# Patient Record
Sex: FEMALE | Race: WHITE | NOT HISPANIC OR LATINO
[De-identification: names, ages, dates, MRNs, and addresses within clinical notes are randomized per-mention and may not be internally consistent; named-entity substitution may affect disease eponyms.]

---

## 2017-01-24 ENCOUNTER — APPOINTMENT (OUTPATIENT)
Age: 57
End: 2017-01-24

## 2017-02-28 ENCOUNTER — APPOINTMENT (OUTPATIENT)
Age: 57
End: 2017-02-28

## 2017-04-04 ENCOUNTER — APPOINTMENT (OUTPATIENT)
Age: 57
End: 2017-04-04

## 2017-10-18 ENCOUNTER — OUTPATIENT (OUTPATIENT)
Dept: OUTPATIENT SERVICES | Facility: HOSPITAL | Age: 57
LOS: 1 days | End: 2017-10-18
Payer: COMMERCIAL

## 2017-10-18 ENCOUNTER — APPOINTMENT (OUTPATIENT)
Dept: ULTRASOUND IMAGING | Facility: IMAGING CENTER | Age: 57
End: 2017-10-18
Payer: COMMERCIAL

## 2017-10-18 DIAGNOSIS — Z00.8 ENCOUNTER FOR OTHER GENERAL EXAMINATION: ICD-10-CM

## 2017-10-18 PROCEDURE — 76536 US EXAM OF HEAD AND NECK: CPT

## 2017-10-18 PROCEDURE — 76536 US EXAM OF HEAD AND NECK: CPT | Mod: 26

## 2018-03-06 ENCOUNTER — APPOINTMENT (OUTPATIENT)
Age: 58
End: 2018-03-06
Payer: OTHER MISCELLANEOUS

## 2018-03-06 PROCEDURE — 99213 OFFICE O/P EST LOW 20 MIN: CPT

## 2018-03-12 ENCOUNTER — FORM ENCOUNTER (OUTPATIENT)
Age: 58
End: 2018-03-12

## 2018-03-13 ENCOUNTER — OUTPATIENT (OUTPATIENT)
Dept: OUTPATIENT SERVICES | Facility: HOSPITAL | Age: 58
LOS: 1 days | End: 2018-03-13
Payer: COMMERCIAL

## 2018-03-13 ENCOUNTER — APPOINTMENT (OUTPATIENT)
Dept: MRI IMAGING | Facility: IMAGING CENTER | Age: 58
End: 2018-03-13
Payer: OTHER MISCELLANEOUS

## 2018-03-13 DIAGNOSIS — M25.562 PAIN IN LEFT KNEE: ICD-10-CM

## 2018-03-13 PROCEDURE — 73721 MRI JNT OF LWR EXTRE W/O DYE: CPT | Mod: 26,LT

## 2018-03-13 PROCEDURE — 73721 MRI JNT OF LWR EXTRE W/O DYE: CPT

## 2018-04-03 ENCOUNTER — APPOINTMENT (OUTPATIENT)
Age: 58
End: 2018-04-03
Payer: OTHER MISCELLANEOUS

## 2018-04-03 PROCEDURE — 99213 OFFICE O/P EST LOW 20 MIN: CPT | Mod: 25

## 2018-04-03 PROCEDURE — 20611 DRAIN/INJ JOINT/BURSA W/US: CPT

## 2018-05-14 ENCOUNTER — LABORATORY RESULT (OUTPATIENT)
Age: 58
End: 2018-05-14

## 2018-05-14 ENCOUNTER — APPOINTMENT (OUTPATIENT)
Dept: ENDOCRINOLOGY | Facility: CLINIC | Age: 58
End: 2018-05-14
Payer: COMMERCIAL

## 2018-05-14 VITALS
HEART RATE: 74 BPM | WEIGHT: 220 LBS | OXYGEN SATURATION: 97 % | DIASTOLIC BLOOD PRESSURE: 70 MMHG | HEIGHT: 71 IN | SYSTOLIC BLOOD PRESSURE: 130 MMHG | BODY MASS INDEX: 30.8 KG/M2

## 2018-05-14 PROCEDURE — 99214 OFFICE O/P EST MOD 30 MIN: CPT

## 2018-05-16 LAB
T3RU NFR SERPL: 1.07 INDEX
T4 SERPL-MCNC: 7.6 UG/DL
TSH SERPL-ACNC: 3.51 UIU/ML

## 2018-06-08 ENCOUNTER — LABORATORY RESULT (OUTPATIENT)
Age: 58
End: 2018-06-08

## 2018-10-18 ENCOUNTER — RESULT REVIEW (OUTPATIENT)
Age: 58
End: 2018-10-18

## 2019-01-17 ENCOUNTER — MEDICATION RENEWAL (OUTPATIENT)
Age: 59
End: 2019-01-17

## 2019-03-05 ENCOUNTER — APPOINTMENT (OUTPATIENT)
Dept: INTERNAL MEDICINE | Facility: CLINIC | Age: 59
End: 2019-03-05
Payer: COMMERCIAL

## 2019-03-05 ENCOUNTER — NON-APPOINTMENT (OUTPATIENT)
Age: 59
End: 2019-03-05

## 2019-03-05 VITALS
OXYGEN SATURATION: 98 % | WEIGHT: 221 LBS | HEART RATE: 70 BPM | BODY MASS INDEX: 30.94 KG/M2 | DIASTOLIC BLOOD PRESSURE: 82 MMHG | HEIGHT: 71 IN | SYSTOLIC BLOOD PRESSURE: 110 MMHG

## 2019-03-05 DIAGNOSIS — Z11.59 ENCOUNTER FOR SCREENING FOR OTHER VIRAL DISEASES: ICD-10-CM

## 2019-03-05 DIAGNOSIS — Z80.1 FAMILY HISTORY OF MALIGNANT NEOPLASM OF TRACHEA, BRONCHUS AND LUNG: ICD-10-CM

## 2019-03-05 DIAGNOSIS — Z83.3 FAMILY HISTORY OF DIABETES MELLITUS: ICD-10-CM

## 2019-03-05 PROCEDURE — 99386 PREV VISIT NEW AGE 40-64: CPT | Mod: 25

## 2019-03-05 PROCEDURE — G0444 DEPRESSION SCREEN ANNUAL: CPT

## 2019-03-05 PROCEDURE — 36415 COLL VENOUS BLD VENIPUNCTURE: CPT

## 2019-03-05 PROCEDURE — 93000 ELECTROCARDIOGRAM COMPLETE: CPT

## 2019-03-05 NOTE — PHYSICAL EXAM
[No Acute Distress] : no acute distress [Well Nourished] : well nourished [Well Developed] : well developed [Well-Appearing] : well-appearing [Normal Voice/Communication] : normal voice/communication [Normal Sclera/Conjunctiva] : normal sclera/conjunctiva [PERRL] : pupils equal round and reactive to light [EOMI] : extraocular movements intact [Normal Outer Ear/Nose] : the outer ears and nose were normal in appearance [Normal Oropharynx] : the oropharynx was normal [Normal TMs] : both tympanic membranes were normal [No JVD] : no jugular venous distention [Supple] : supple [No Lymphadenopathy] : no lymphadenopathy [Thyroid Normal, No Nodules] : the thyroid was normal and there were no nodules present [No Respiratory Distress] : no respiratory distress  [Clear to Auscultation] : lungs were clear to auscultation bilaterally [No Accessory Muscle Use] : no accessory muscle use [Normal Rate] : normal rate  [Regular Rhythm] : with a regular rhythm [Normal S1, S2] : normal S1 and S2 [No Murmur] : no murmur heard [No Carotid Bruits] : no carotid bruits [No Abdominal Bruit] : a ~M bruit was not heard ~T in the abdomen [No Varicosities] : no varicosities [Pedal Pulses Present] : the pedal pulses are present [No Edema] : there was no peripheral edema [No Extremity Clubbing/Cyanosis] : no extremity clubbing/cyanosis [No Palpable Aorta] : no palpable aorta [Normal Appearance] : normal in appearance [No Nipple Discharge] : no nipple discharge [No Axillary Lymphadenopathy] : no axillary lymphadenopathy [Soft] : abdomen soft [Non Tender] : non-tender [Non-distended] : non-distended [No Masses] : no abdominal mass palpated [No HSM] : no HSM [Normal Bowel Sounds] : normal bowel sounds [Normal Posterior Cervical Nodes] : no posterior cervical lymphadenopathy [Normal Anterior Cervical Nodes] : no anterior cervical lymphadenopathy [No CVA Tenderness] : no CVA  tenderness [No Spinal Tenderness] : no spinal tenderness [No Joint Swelling] : no joint swelling [Grossly Normal Strength/Tone] : grossly normal strength/tone [No Rash] : no rash [Normal Gait] : normal gait [Coordination Grossly Intact] : coordination grossly intact [No Focal Deficits] : no focal deficits [Deep Tendon Reflexes (DTR)] : deep tendon reflexes were 2+ and symmetric [Normal Affect] : the affect was normal [Normal Insight/Judgement] : insight and judgment were intact

## 2019-03-07 PROBLEM — Z11.59 NEED FOR HEPATITIS C SCREENING TEST: Status: ACTIVE | Noted: 2019-03-05

## 2019-03-07 PROBLEM — Z80.1 FAMILY HISTORY OF LUNG CANCER: Status: ACTIVE | Noted: 2019-03-07

## 2019-03-07 PROBLEM — Z83.3 FAMILY HISTORY OF TYPE 2 DIABETES MELLITUS: Status: ACTIVE | Noted: 2019-03-07

## 2019-03-07 NOTE — HISTORY OF PRESENT ILLNESS
[de-identified] : New Patient - 1st visit to [facility] [place].

## 2019-03-07 NOTE — ASSESSMENT
[FreeTextEntry1] : (1) HCM - discussed diet, exercise, weight maintenance.  Labs ordered as below, including Hepatitis C screening.  HIV testing offered to patient and patient declined.  Patient reports an influenza vaccination this past season.  She will check with [facility] to see if she received a Tdap.  Patient can get the Shingrix when it becomes available (currently on national backorder).  Continue annual screening mammograms and Gyn visits as directed.  Patient declines screening colonoscopy but is willing to do FIT.  Patient given Health Care Proxy information today.\par \par (2) CV - patient on diltiazem for prior atrial tachycardia, and has been stable.  ECG is NSR.\par \par (3) Endo - follow-up with Dr. Kennedy for thyroid nodules.

## 2019-03-22 LAB
25(OH)D3 SERPL-MCNC: 25 NG/ML
ALBUMIN SERPL ELPH-MCNC: 4.4 G/DL
ALP BLD-CCNC: 72 U/L
ALT SERPL-CCNC: 14 U/L
ANION GAP SERPL CALC-SCNC: 17 MMOL/L
APPEARANCE: CLEAR
AST SERPL-CCNC: 18 U/L
BACTERIA: NEGATIVE
BASOPHILS # BLD AUTO: 0.11 K/UL
BASOPHILS NFR BLD AUTO: 1.4 %
BILIRUB SERPL-MCNC: 0.3 MG/DL
BILIRUBIN URINE: NEGATIVE
BLOOD URINE: NEGATIVE
BUN SERPL-MCNC: 14 MG/DL
CALCIUM SERPL-MCNC: 9.4 MG/DL
CHLORIDE SERPL-SCNC: 108 MMOL/L
CHOLEST SERPL-MCNC: 209 MG/DL
CHOLEST/HDLC SERPL: 3.4 RATIO
CO2 SERPL-SCNC: 18 MMOL/L
COLOR: YELLOW
CREAT SERPL-MCNC: 0.99 MG/DL
EOSINOPHIL # BLD AUTO: 0.2 K/UL
EOSINOPHIL NFR BLD AUTO: 2.5 %
GLUCOSE QUALITATIVE U: NEGATIVE
GLUCOSE SERPL-MCNC: 95 MG/DL
HBA1C MFR BLD HPLC: 5.3 %
HCT VFR BLD CALC: 44.4 %
HCV AB SER QL: NONREACTIVE
HCV S/CO RATIO: 0.19 S/CO
HDLC SERPL-MCNC: 62 MG/DL
HGB BLD-MCNC: 14.6 G/DL
HYALINE CASTS: 1 /LPF
IMM GRANULOCYTES NFR BLD AUTO: 0.1 %
KETONES URINE: NEGATIVE
LDLC SERPL CALC-MCNC: 124 MG/DL
LEUKOCYTE ESTERASE URINE: NEGATIVE
LYMPHOCYTES # BLD AUTO: 2.27 K/UL
LYMPHOCYTES NFR BLD AUTO: 28.4 %
MAN DIFF?: NORMAL
MCHC RBC-ENTMCNC: 29.2 PG
MCHC RBC-ENTMCNC: 32.9 GM/DL
MCV RBC AUTO: 88.8 FL
MICROSCOPIC-UA: NORMAL
MONOCYTES # BLD AUTO: 0.65 K/UL
MONOCYTES NFR BLD AUTO: 8.1 %
NEUTROPHILS # BLD AUTO: 4.75 K/UL
NEUTROPHILS NFR BLD AUTO: 59.5 %
NITRITE URINE: NEGATIVE
PH URINE: 6
PLATELET # BLD AUTO: 331 K/UL
POTASSIUM SERPL-SCNC: 4.2 MMOL/L
PROT SERPL-MCNC: 7.3 G/DL
PROTEIN URINE: NORMAL
RBC # BLD: 5 M/UL
RBC # FLD: 13.2 %
RED BLOOD CELLS URINE: 6 /HPF
SODIUM SERPL-SCNC: 143 MMOL/L
SPECIFIC GRAVITY URINE: 1.02
SQUAMOUS EPITHELIAL CELLS: 4 /HPF
T4 FREE SERPL-MCNC: 1.3 NG/DL
TRIGL SERPL-MCNC: 116 MG/DL
TSH SERPL-ACNC: 3.75 UIU/ML
UROBILINOGEN URINE: NORMAL
WBC # FLD AUTO: 7.99 K/UL
WHITE BLOOD CELLS URINE: 8 /HPF

## 2019-05-14 ENCOUNTER — TRANSCRIPTION ENCOUNTER (OUTPATIENT)
Age: 59
End: 2019-05-14

## 2019-05-15 ENCOUNTER — APPOINTMENT (OUTPATIENT)
Dept: ORTHOPEDIC SURGERY | Facility: CLINIC | Age: 59
End: 2019-05-15
Payer: OTHER MISCELLANEOUS

## 2019-05-15 VITALS
BODY MASS INDEX: 30.94 KG/M2 | DIASTOLIC BLOOD PRESSURE: 77 MMHG | WEIGHT: 221 LBS | HEART RATE: 64 BPM | SYSTOLIC BLOOD PRESSURE: 123 MMHG | HEIGHT: 71 IN

## 2019-05-15 DIAGNOSIS — S50.02XA CONTUSION OF LEFT ELBOW, INITIAL ENCOUNTER: ICD-10-CM

## 2019-05-15 DIAGNOSIS — Z78.9 OTHER SPECIFIED HEALTH STATUS: ICD-10-CM

## 2019-05-15 PROCEDURE — 99204 OFFICE O/P NEW MOD 45 MIN: CPT

## 2019-05-17 NOTE — RETURN TO WORK/SCHOOL
[FreeTextEntry1] : Felisha was seen today for evaluation of right rib pain and left wrist/forearm pain status post fall. She is unable to return to work at this time. She'll be reevaluated in 2 weeks.\par Thank you for your understanding.\par \par Sincerely,\par \par Steven Canaad DO, ATC\par Primary Care Sports Medicine\par [facility] [place]\par

## 2019-05-17 NOTE — HISTORY OF PRESENT ILLNESS
[de-identified] : Patient is here for left arm/rib pain that began on 5/14/19 when she tripped on a curb while walking into work. She went to the ER where xrays were taken that were negative for fracture. She was informed by her PCP that she should follow up with an orthopedic. She has been taking Naproxen with minimal relief and has been using ice. She has difficulty moving her forearm/wrist and increased pain when taking a deep breath. Denies N/T/R/Prior injury.

## 2019-05-17 NOTE — DISCUSSION/SUMMARY
[de-identified] : Discussed findings of today's exam and possible causes of patient's pain.  Educated patient on their most probable diagnosis of left wrist/forearm pain, and right 6th rib pain status post fall.  Reviewed possible courses of treatment, and we collaboratively decided best course of treatment at this time will include conservative management. I advised the patient that she did not sustain a fracture of her right rib cage, however she has a significant rib contusion with resulting costochondritis. I advised the patient that he is injuries can take 1-2 months to resolve, and the mainstay of treatment his pain control. 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 Patient advised that she has a contusion of the left wrist/forearm with resultant swelling, no evidence of fracture or tendon/ligament rupture. At this time recommend a course of physical therapy to address the stiffness that issue. Patient can continue taking naproxen as needed, and she is requesting a stronger medications have available for the evenings to help get some sleep.  A prescription of tramadol has been prescribed, I informed the patient that this is a narcotic pain medication and while it is of low potency, it still causes sedation and it should not be taken while operating machinery, or while caring for children/family members alone.  I also advised the patient that all narcotic pain medications have addictive potential and therefore should be taken as little as possible, and for the shortest duration needed. 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  Patient appreciates and agrees with current plan.\par \par This note was generated using dragon medical dictation software.  A reasonable effort has been made for proofreading its contents, but typos may still remain.  If there are any questions or points of clarification needed please notify my office.

## 2019-05-17 NOTE — ADDENDUM
[FreeTextEntry1] : Patient was offered a same-day physical therapy appointment by our [facility], she declined that appointment. She was contacted again the following day to schedule at her next convenience, patient declined physical therapy and did not schedule any appointments.

## 2019-05-17 NOTE — PHYSICAL EXAM
[de-identified] : Constitutional: Well-nourished, well-developed, No acute distress\par Respiratory:  Good respiratory effort, no SOB\par Lymphatic: No regional lymphadenopathy, no lymphedema\par Psychiatric: Pleasant and normal affect, alert and oriented x3\par Skin: Clean dry and intact B/L UE/LE\par Musculoskeletal: normal except where as noted in regional exam\par \par Rib cage:\par \par POSITIVE TENDERNESS: + Significant TTP of the right 6th rib in the midclavicular line with diffuse pain along the neighboring ribs and costochondral junctions\par \par NONTENDER:  No tenderness left sided ribs 1-12 along the anterior, body and posterior portions b/l, no bony tenderness of the thoracic spine, no facet tenderness, no tenderness of xiphoid, sternum or manubrium, no clavicular, SC or AC joint tenderness b/l.  \par \par ROM:  Moderately decreased excursion of the rib cage with increased pain on deep breathing \par \par Thoracic Spine Exam:  normal curvature and normal alignment. forward stooped posture due to pain.  no midline bony tenderness, no marked spasm. no marked tenderness in paraspinal muscles.  ROM limited in all directions due to pain\par \par \par Right Wrist:\par APPEARANCE: no marked deformities, no swelling or malalignment\par POSITIVE TENDERNESS: none\par NONTENDER: snuffbox, scapholunate, DRUJ, TFCC, FCU/FCR, ECU/ECRL/ECRB, radial/ulnar styloid, CMC, and MCP joints. \par ROM: full & painless. \par RESISTIVE TESTING: painless resisted wrist flexion/extension, and radial/ulnar deviation. \par SPECIAL TESTS: neg Finkelstein's. neg Phalen's. neg reverse Phalen's. neg Lao's. neg katie test. neg TFCC grind. neg tinel's at the carpal tunnel\par Vasc: 2+ radial pulse\par Neuro: AIN, PIN, Ulnar nerve intact to motor\par Sensation: Intact to light touch throughout\par B/L Shoulders:  No asymmetry, malalignment, or swelling, Full ROM, 5/5 strength in flexion/ext, Abd/Add, IR/ER, Joints stable\par B/L Elbows:  No asymmetry, malalignment, or swelling, Full ROM, 5/5 strength in flex/ext, pronation/supination, Joints stable\par \par Left Wrist:\par APPEARANCE: Mild swelling of dorsal wrist with extension into the extensor forearm, no marked deformities or malalignment\par POSITIVE TENDERNESS: DRUJ, dorsal wrist, ECU/ECRL/ECRB, \par NONTENDER: snuffbox, scapholunate, TFCC, FCU/FCR, radial/ulnar styloid, CMC, and MCP joints.\par ROM: Limited in all directions due to stiffness and pain. \par RESISTIVE TESTING: 3/5 painful resisted wrist flexion/extension, and radial/ulnar deviation. \par SPECIAL TESTS: neg Finkelstein's. neg Phalen's. neg reverse Phalen's. neg Lao's. neg katie test. neg TFCC grind. neg tinel's at the carpal tunnel\par Vasc: 2+ radial pulse\par Neuro: AIN, PIN, Ulnar nerve intact to motor\par Sensation: Intact to light touch throughout\par \par  [de-identified] : I reviewed and clinically correlated the following outside imaging studies, \par Procedure was performed at the [facility] \par \par EXAM: RIBS SERIES RIGHT \par \par \par PROCEDURE DATE: 05/14/2019 \par \par \par INTERPRETATION: \par \par CLINICAL INDICATION: Right-sided rib pain status post fall. \par TECHNIQUE: PA chest. AP, BOLIVAR, and HESHAM views of the right ribs. \par \par COMPARISON: Chest x-ray 7 September 2011. \par \par FINDINGS: \par \par Chest: The lungs are clear. The cardiomediastinal silhouette, hiram, and \par vascular markings are within normal limits. No pleural effusion or \par pneumothorax. \par \par Ribs: No displaced rib fracture identified. \par \par \par \par IMPRESSION: \par No displaced rib fracture or pneumothorax. \par \par \par \par Procedure was performed at the [facility] \par \par EXAM: WRIST LEFT \par \par EXAM: FOREARM LEFT \par \par \par PROCEDURE DATE: 05/14/2019 \par \par \par INTERPRETATION: \par \par CLINICAL INDICATION: Left upper extremity pain status post fall. Question \par fracture. \par TECHNIQUE: AP and lateral radiographs of the left forearm. PA, oblique, \par lateral, and scaphoid views of the left wrist. \par \par COMPARISON: None available. \par \par FINDINGS: \par \par No acute fracture. No dislocation. Cartilage spaces are maintained. No \par abnormal soft tissue swelling or mineralization. No radiopaque foreign body. \par \par \par \par IMPRESSION: \par No acute fracture or dislocation. \par \par

## 2019-05-20 ENCOUNTER — TRANSCRIPTION ENCOUNTER (OUTPATIENT)
Age: 59
End: 2019-05-20

## 2019-05-28 ENCOUNTER — APPOINTMENT (OUTPATIENT)
Dept: ORTHOPEDIC SURGERY | Facility: CLINIC | Age: 59
End: 2019-05-28
Payer: OTHER MISCELLANEOUS

## 2019-05-28 DIAGNOSIS — S60.212A CONTUSION OF LEFT WRIST, INITIAL ENCOUNTER: ICD-10-CM

## 2019-05-28 DIAGNOSIS — S20.219A CONTUSION OF UNSPECIFIED FRONT WALL OF THORAX, INITIAL ENCOUNTER: ICD-10-CM

## 2019-05-28 PROCEDURE — 99213 OFFICE O/P EST LOW 20 MIN: CPT

## 2019-05-28 NOTE — DISCUSSION/SUMMARY
[de-identified] : Patient was seen today for followup of left wrist/forearm pain and right rib pain status post fall. She has had some good improvement in her right rib pain since last evaluation, it is more of a mild uncomfortable pain, not significant at this time. However, patient has had little to no change in her left wrist pain. She still is pain with range of motion, gripping and twisting activities. She did not go to any visits of physical therapy as she states she was too busy with her upcoming daughter's wedding plans. She states she is wearing her wrist splint most of the day, however she presents to the office today not wearing her splint which she states is in the car. Patient advised that she still has no focal deficits on exam, he has significant ligament or tendon injury. This is unfortunately expected timeframe of recovery with a significant wrist contusion and injury. Her daughter's wedding is this weekend, patient is advised she should start her course of physical therapy next week if she cannot get him this week. 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  Follow up as needed.  Patient appreciates and agrees with current plan.\par \par This note was generated using dragon medical dictation software.  A reasonable effort has been made for proofreading its contents, but typos may still remain.  If there are any questions or points of clarification needed please notify my office.

## 2019-05-28 NOTE — REASON FOR VISIT
[Follow-Up Visit] : a follow-up visit for [Family Member] : family member [FreeTextEntry2] : left wrist/right rib pain

## 2019-05-28 NOTE — HISTORY OF PRESENT ILLNESS
[de-identified] : Patient is here for left wrist/right rib pain follow up. She states that she has had some improvement in her rib pain. She continues to have left wrist pain and swelling. She has been taking Naproxen during the day and Tramadol at night. She states that she has been wearing the wrist brace and has been icing but has continued to use her arm for daily activites. She was offered PT appointments at her convenience but she was unable to make appointments as her daughter's wedding is this weekend.

## 2019-05-28 NOTE — RETURN TO WORK/SCHOOL
[FreeTextEntry1] : Felisha is to return to regular work duties on Tuesday 6/4/19.\par Thank you for your understanding.\par \par Sincerely,\par \par Steven Canada DO, ATC\par Primary Care Sports Medicine\par [facility] [place]\par

## 2019-05-28 NOTE — PHYSICAL EXAM
[de-identified] : Constitutional: Well-nourished, well-developed, No acute distress\par Respiratory:  Good respiratory effort, no SOB\par Lymphatic: No regional lymphadenopathy, no lymphedema\par Psychiatric: Pleasant and normal affect, alert and oriented x3\par Skin: Clean dry and intact B/L UE/LE\par Musculoskeletal: normal except where as noted in regional exam\par \par Rib cage:\par \par POSITIVE TENDERNESS: + mild TTP of the right 6th rib in the midclavicular line with diffuse pain along the neighboring ribs and costochondral junctions\par \par NONTENDER:  No tenderness left sided ribs 1-12 along the anterior, body and posterior portions b/l, no bony tenderness of the thoracic spine, no facet tenderness, no tenderness of xiphoid, sternum or manubrium, no clavicular, SC or AC joint tenderness b/l.  \par \par ROM:  Moderately decreased excursion of the rib cage with increased pain on deep breathing \par \par Thoracic Spine Exam:  normal curvature and normal alignment. forward stooped posture due to pain.  no midline bony tenderness, no marked spasm. no marked tenderness in paraspinal muscles.  ROM limited in all directions due to pain\par \par \par Left Wrist:\par APPEARANCE: Mild swelling of dorsal wrist with extension into the extensor forearm, no marked deformities or malalignment\par POSITIVE TENDERNESS: DRUJ, dorsal wrist, ECU/ECRL/ECRB, \par NONTENDER: snuffbox, scapholunate, TFCC, FCU/FCR, radial/ulnar styloid, CMC, and MCP joints.\par ROM: Limited in all directions due to stiffness and pain. \par RESISTIVE TESTING: 3/5 painful resisted wrist flexion/extension, and radial/ulnar deviation. \par SPECIAL TESTS: neg Finkelstein's. neg Phalen's. neg reverse Phalen's. neg Lao's. neg katie test. neg TFCC grind. neg tinel's at the carpal tunnel\par Vasc: 2+ radial pulse\par Neuro: AIN, PIN, Ulnar nerve intact to motor\par Sensation: Intact to light touch throughout\par \par

## 2019-06-05 ENCOUNTER — RX RENEWAL (OUTPATIENT)
Age: 59
End: 2019-06-05

## 2019-06-14 ENCOUNTER — APPOINTMENT (OUTPATIENT)
Dept: INTERNAL MEDICINE | Facility: CLINIC | Age: 59
End: 2019-06-14
Payer: COMMERCIAL

## 2019-06-14 ENCOUNTER — NON-APPOINTMENT (OUTPATIENT)
Age: 59
End: 2019-06-14

## 2019-06-14 VITALS
BODY MASS INDEX: 31.5 KG/M2 | WEIGHT: 220 LBS | TEMPERATURE: 98.3 F | HEART RATE: 62 BPM | DIASTOLIC BLOOD PRESSURE: 78 MMHG | SYSTOLIC BLOOD PRESSURE: 114 MMHG | OXYGEN SATURATION: 98 % | HEIGHT: 70 IN

## 2019-06-14 DIAGNOSIS — H93.19 TINNITUS, UNSPECIFIED EAR: ICD-10-CM

## 2019-06-14 DIAGNOSIS — R60.0 LOCALIZED EDEMA: ICD-10-CM

## 2019-06-14 PROCEDURE — 36415 COLL VENOUS BLD VENIPUNCTURE: CPT

## 2019-06-14 PROCEDURE — 99214 OFFICE O/P EST MOD 30 MIN: CPT | Mod: 25

## 2019-06-14 PROCEDURE — 93000 ELECTROCARDIOGRAM COMPLETE: CPT

## 2019-06-21 PROBLEM — R60.0 EDEMA OF BOTH LEGS: Status: ACTIVE | Noted: 2019-06-14

## 2019-06-21 LAB
ALBUMIN SERPL ELPH-MCNC: 4.3 G/DL
ALP BLD-CCNC: 88 U/L
ALT SERPL-CCNC: 12 U/L
ANION GAP SERPL CALC-SCNC: 12 MMOL/L
AST SERPL-CCNC: 16 U/L
BASOPHILS # BLD AUTO: 0.1 K/UL
BASOPHILS NFR BLD AUTO: 1.3 %
BILIRUB SERPL-MCNC: 0.3 MG/DL
BUN SERPL-MCNC: 14 MG/DL
CALCIUM SERPL-MCNC: 9.5 MG/DL
CHLORIDE SERPL-SCNC: 104 MMOL/L
CO2 SERPL-SCNC: 25 MMOL/L
CREAT SERPL-MCNC: 0.9 MG/DL
EOSINOPHIL # BLD AUTO: 0.26 K/UL
EOSINOPHIL NFR BLD AUTO: 3.5 %
GLUCOSE SERPL-MCNC: 117 MG/DL
HCT VFR BLD CALC: 44.5 %
HGB BLD-MCNC: 13.9 G/DL
IMM GRANULOCYTES NFR BLD AUTO: 0.4 %
LYMPHOCYTES # BLD AUTO: 2.19 K/UL
LYMPHOCYTES NFR BLD AUTO: 29.6 %
MAN DIFF?: NORMAL
MCHC RBC-ENTMCNC: 28.3 PG
MCHC RBC-ENTMCNC: 31.2 GM/DL
MCV RBC AUTO: 90.4 FL
MONOCYTES # BLD AUTO: 0.53 K/UL
MONOCYTES NFR BLD AUTO: 7.2 %
NEUTROPHILS # BLD AUTO: 4.3 K/UL
NEUTROPHILS NFR BLD AUTO: 58 %
PLATELET # BLD AUTO: 299 K/UL
POTASSIUM SERPL-SCNC: 3.8 MMOL/L
PROT SERPL-MCNC: 6.8 G/DL
RBC # BLD: 4.92 M/UL
RBC # FLD: 13.8 %
SODIUM SERPL-SCNC: 141 MMOL/L
T4 FREE SERPL-MCNC: 1.3 NG/DL
TSH SERPL-ACNC: 3.63 UIU/ML
WBC # FLD AUTO: 7.41 K/UL

## 2019-06-21 NOTE — PHYSICAL EXAM
[No JVD] : no jugular venous distention [Normal Voice/Communication] : normal voice/communication [Clear to Auscultation] : lungs were clear to auscultation bilaterally [No Respiratory Distress] : no respiratory distress  [No Accessory Muscle Use] : no accessory muscle use [Normal S1, S2] : normal S1 and S2 [Normal Rate] : normal rate  [Regular Rhythm] : with a regular rhythm [No Edema] : there was no peripheral edema [No Murmur] : no murmur heard [Soft] : abdomen soft [No Masses] : no abdominal mass palpated [Non Tender] : non-tender [Non-distended] : non-distended [Normal Bowel Sounds] : normal bowel sounds

## 2019-06-26 ENCOUNTER — APPOINTMENT (OUTPATIENT)
Dept: CARDIOLOGY | Facility: CLINIC | Age: 59
End: 2019-06-26
Payer: COMMERCIAL

## 2019-06-26 ENCOUNTER — NON-APPOINTMENT (OUTPATIENT)
Age: 59
End: 2019-06-26

## 2019-06-26 VITALS
SYSTOLIC BLOOD PRESSURE: 133 MMHG | DIASTOLIC BLOOD PRESSURE: 85 MMHG | BODY MASS INDEX: 31.5 KG/M2 | OXYGEN SATURATION: 98 % | WEIGHT: 220 LBS | HEART RATE: 59 BPM | HEIGHT: 70 IN

## 2019-06-26 PROCEDURE — 93000 ELECTROCARDIOGRAM COMPLETE: CPT

## 2019-06-26 PROCEDURE — 99204 OFFICE O/P NEW MOD 45 MIN: CPT

## 2019-06-26 NOTE — PHYSICAL EXAM
[General Appearance - Well Developed] : well developed [Normal Appearance] : normal appearance [Well Groomed] : well groomed [General Appearance - Well Nourished] : well nourished [No Deformities] : no deformities [General Appearance - In No Acute Distress] : no acute distress [Normal Conjunctiva] : the conjunctiva exhibited no abnormalities [Eyelids - No Xanthelasma] : the eyelids demonstrated no xanthelasmas [Normal Oral Mucosa] : normal oral mucosa [No Oral Pallor] : no oral pallor [No Oral Cyanosis] : no oral cyanosis [Normal Jugular Venous A Waves Present] : normal jugular venous A waves present [Normal Jugular Venous V Waves Present] : normal jugular venous V waves present [No Jugular Venous Cooley A Waves] : no jugular venous cooley A waves [Heart Rate And Rhythm] : heart rate and rhythm were normal [Heart Sounds] : normal S1 and S2 [Murmurs] : no murmurs present [Respiration, Rhythm And Depth] : normal respiratory rhythm and effort [Exaggerated Use Of Accessory Muscles For Inspiration] : no accessory muscle use [Auscultation Breath Sounds / Voice Sounds] : lungs were clear to auscultation bilaterally [Abdomen Soft] : soft [Abdomen Tenderness] : non-tender [Abdomen Mass (___ Cm)] : no abdominal mass palpated [Abnormal Walk] : normal gait [Gait - Sufficient For Exercise Testing] : the gait was sufficient for exercise testing [Skin Color & Pigmentation] : normal skin color and pigmentation [] : no rash [No Venous Stasis] : no venous stasis [Skin Lesions] : no skin lesions [No Skin Ulcers] : no skin ulcer [No Xanthoma] : no  xanthoma was observed [Oriented To Time, Place, And Person] : oriented to person, place, and time [Affect] : the affect was normal [Mood] : the mood was normal [No Anxiety] : not feeling anxious

## 2019-07-04 ENCOUNTER — FORM ENCOUNTER (OUTPATIENT)
Age: 59
End: 2019-07-04

## 2019-07-05 ENCOUNTER — OUTPATIENT (OUTPATIENT)
Dept: OUTPATIENT SERVICES | Facility: HOSPITAL | Age: 59
LOS: 1 days | End: 2019-07-05
Payer: COMMERCIAL

## 2019-07-05 ENCOUNTER — APPOINTMENT (OUTPATIENT)
Dept: RADIOLOGY | Facility: CLINIC | Age: 59
End: 2019-07-05
Payer: COMMERCIAL

## 2019-07-05 DIAGNOSIS — R06.00 DYSPNEA, UNSPECIFIED: ICD-10-CM

## 2019-07-05 PROCEDURE — 71046 X-RAY EXAM CHEST 2 VIEWS: CPT | Mod: 26

## 2019-07-05 PROCEDURE — 71046 X-RAY EXAM CHEST 2 VIEWS: CPT

## 2019-07-08 ENCOUNTER — APPOINTMENT (OUTPATIENT)
Dept: VASCULAR SURGERY | Facility: CLINIC | Age: 59
End: 2019-07-08

## 2019-07-17 NOTE — ASSESSMENT
[FreeTextEntry1] : Patient with LE edema most likely from venous insufficiency.  Patient advised to keep the legs elevated while in bed or in a chair, and to also get support stockings.  Labs ordered in office as below.\par \par For the intermittent dyspnea, patient sent for a CXR in addition to the labs, and will follow-up with me.  For the palpitations, an ECG is NSR, but patient given a cardiology referral.\par \par ENT referral given for tinnitus.\par \par Alprazolam refilled on request.

## 2019-07-17 NOTE — ADDENDUM
[FreeTextEntry1] : 6/21/19 - Spoke with patient at 6:25PM.  Labs WNL.  Patient has a vascular appointment, and will also schedule a cardiology appointment as well.\par \par 7/17/19 - Left detailed message for patient at 5:35PM.  CXR - no infiltrate or PTX.  There are findings c/w eugenio rib fractures on the anterior aspects of the right 5th and 6th ribs.  Mild DJD of spine.

## 2019-07-17 NOTE — HISTORY OF PRESENT ILLNESS
[FreeTextEntry8] : Patient presents for swollen legs.  She has had flu like symptoms since her fall.  She gets some pain when she takes a deep breath.  She feels her heart beating.  There are no fevers, but there are body aches.  She had swelling on 6/2/19, a day after her daughter's wedding.  She was on her feet a lot.  She has to put flip flops on at the end of the day.  The legs are normal in the morning, and they get worse as soon as she gets to work.  She tries to walk at work twice a day.  She feels winded and hoarse.\par \par Patient was seen in [facility] after a fall at work.  She hurt her knees bilaterally, left arm, right rib, and lacerated above the R eye brow (2 stitches, removed after a week).  No fractures.  She hurt the left foot.  She had stitches to her head.\par \par Patient has ringing in her ears.\par \par Patient saw Dr Canada (ortho), and got a naproxen.  She has tramadol, and used the Lidocaine patch as well.  She gets nausea from the NSAIDs, but takes 2 Advil's in the AM.

## 2019-07-22 ENCOUNTER — APPOINTMENT (OUTPATIENT)
Dept: VASCULAR SURGERY | Facility: CLINIC | Age: 59
End: 2019-07-22
Payer: COMMERCIAL

## 2019-07-22 VITALS
HEIGHT: 70 IN | BODY MASS INDEX: 30.92 KG/M2 | TEMPERATURE: 97.6 F | WEIGHT: 216 LBS | HEART RATE: 65 BPM | DIASTOLIC BLOOD PRESSURE: 77 MMHG | SYSTOLIC BLOOD PRESSURE: 111 MMHG

## 2019-07-22 PROCEDURE — 99203 OFFICE O/P NEW LOW 30 MIN: CPT

## 2019-07-22 PROCEDURE — 93970 EXTREMITY STUDY: CPT

## 2019-07-22 NOTE — HISTORY OF PRESENT ILLNESS
[FreeTextEntry1] : I have just had the pleasure of seeing Mrs. Felisha Strauss ([id number]) in consultation for lower extremity edema.\par \par Mrs. Strauss is a pleasant 58-year-old lady who presents with bilateral foot and ankle swelling that she first noted on 6/2/19 (day after daughter’s wedding). She reports that symptoms are worse on the left side. Edema worsens as the day progresses. She denies any skin pigmentation changes, open or healed ulcers. She denies any history of DVT, SVT or other thromboembolic disease. She denies any lower extremity claudication, rest pain, or tissue loss. She has not had any prior vascular surgical intervention on her lower extremities. 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 She is not currently wearing compression stockings. She reports that she fell in May of this year, sustaining rib fractures, trauma to the face and injury to the left leg/foot. This was never formally evaluated. \par \par She denies any history of MI, CAD, CHF, CVA, TIA, CRI or DM.\par \par PMH: supraventricular tachycardia, anxiety, hypothyroidism, MVP, D&C\par \par Meds: Cardizem, Xanax, Ultram \par \par All: Erythromycin, Penicillin, Vancomycin, Sulfa\par 	\par FH: NC\par \par SH: Non-smoker\par

## 2019-07-22 NOTE — ASSESSMENT
[FreeTextEntry1] : In summary, Mrs. Strauss presents with left ankle edema. A lower extremity venous duplex ultrasound was obtained in the office today, which showed no evidence of DVT, SVT or insufficiency in either extremity. I have provided Mrs. Strauss a prescription for compression stockings and instructed to wear these daily and remove at night. She will undergo evaluation by Dr. Ocampo today to assess for foot/ankle injury following trauma.\par \par Thank you for allowing me to participate in the care of this nice lady. Please do not hesitate to contact me with any questions. \par

## 2019-07-22 NOTE — PHYSICAL EXAM
[de-identified] : On physical examination the patient is NAD. Neurologically intact. The lungs are clear and the heart has an irregular rate and rhythm. The abdomen is soft, without pulsatile mass. Bilateral femoral pulses and PT pulses are palpable. Left ankle edema

## 2019-07-23 ENCOUNTER — MESSAGE (OUTPATIENT)
Age: 59
End: 2019-07-23

## 2019-07-23 DIAGNOSIS — S22.39XA FRACTURE OF ONE RIB, UNSPECIFIED SIDE, INITIAL ENCOUNTER FOR CLOSED FRACTURE: ICD-10-CM

## 2019-07-31 ENCOUNTER — TRANSCRIPTION ENCOUNTER (OUTPATIENT)
Age: 59
End: 2019-07-31

## 2019-08-07 NOTE — DISCUSSION/SUMMARY
[FreeTextEntry1] : Given edema will check Echo. \par Cont dilt for now given history of AT. \par Further recommendations pending results of testing.

## 2019-08-07 NOTE — HISTORY OF PRESENT ILLNESS
[FreeTextEntry1] : She has a history of AT with comprehensive work up at that time that did not show any structural heart disease. She had a recent fall which has put her system out of wack. Her daughter got  June 1. Since that time she notes that her ankles have been swelling. The are better in the morning and worse throughout the day. Her heart starts to race around and she takes the dilt which takes a few hours before the dilt works. 2 cups of coffee in the morning. She also notes that she notes some shortness of breath with exertion. Mentioned fall was in May. She has rib pain and has difficulty taking deep breath. She notes GAMEZ started at the same time as the leg edema. No heaviness in the chest.

## 2019-08-19 ENCOUNTER — OUTPATIENT (OUTPATIENT)
Dept: OUTPATIENT SERVICES | Facility: HOSPITAL | Age: 59
LOS: 1 days | End: 2019-08-19
Payer: COMMERCIAL

## 2019-08-19 ENCOUNTER — APPOINTMENT (OUTPATIENT)
Dept: CV DIAGNOSITCS | Facility: HOSPITAL | Age: 59
End: 2019-08-19

## 2019-08-19 DIAGNOSIS — I25.10 ATHEROSCLEROTIC HEART DISEASE OF NATIVE CORONARY ARTERY WITHOUT ANGINA PECTORIS: ICD-10-CM

## 2019-08-19 PROCEDURE — 93306 TTE W/DOPPLER COMPLETE: CPT | Mod: 26

## 2019-08-19 PROCEDURE — 93306 TTE W/DOPPLER COMPLETE: CPT

## 2019-08-21 ENCOUNTER — APPOINTMENT (OUTPATIENT)
Dept: OTOLARYNGOLOGY | Facility: CLINIC | Age: 59
End: 2019-08-21

## 2019-09-26 ENCOUNTER — APPOINTMENT (OUTPATIENT)
Dept: MRI IMAGING | Facility: CLINIC | Age: 59
End: 2019-09-26
Payer: COMMERCIAL

## 2019-09-26 ENCOUNTER — OUTPATIENT (OUTPATIENT)
Dept: OUTPATIENT SERVICES | Facility: HOSPITAL | Age: 59
LOS: 1 days | End: 2019-09-26
Payer: COMMERCIAL

## 2019-09-26 DIAGNOSIS — H90.42 SENSORINEURAL HEARING LOSS, UNILATERAL, LEFT EAR, WITH UNRESTRICTED HEARING ON THE CONTRALATERAL SIDE: ICD-10-CM

## 2019-09-26 PROCEDURE — 70553 MRI BRAIN STEM W/O & W/DYE: CPT

## 2019-09-26 PROCEDURE — A9585: CPT

## 2019-09-26 PROCEDURE — 70553 MRI BRAIN STEM W/O & W/DYE: CPT | Mod: 26

## 2020-01-10 ENCOUNTER — TRANSCRIPTION ENCOUNTER (OUTPATIENT)
Age: 60
End: 2020-01-10

## 2020-01-17 ENCOUNTER — TRANSCRIPTION ENCOUNTER (OUTPATIENT)
Age: 60
End: 2020-01-17

## 2020-02-03 ENCOUNTER — RX RENEWAL (OUTPATIENT)
Age: 60
End: 2020-02-03

## 2020-03-10 ENCOUNTER — APPOINTMENT (OUTPATIENT)
Dept: INTERNAL MEDICINE | Facility: CLINIC | Age: 60
End: 2020-03-10
Payer: COMMERCIAL

## 2020-03-10 VITALS
OXYGEN SATURATION: 97 % | DIASTOLIC BLOOD PRESSURE: 72 MMHG | WEIGHT: 233 LBS | SYSTOLIC BLOOD PRESSURE: 110 MMHG | BODY MASS INDEX: 33.36 KG/M2 | HEART RATE: 71 BPM | HEIGHT: 70 IN | TEMPERATURE: 97.7 F

## 2020-03-10 DIAGNOSIS — Z80.0 FAMILY HISTORY OF MALIGNANT NEOPLASM OF DIGESTIVE ORGANS: ICD-10-CM

## 2020-03-10 DIAGNOSIS — M79.606 PAIN IN LEG, UNSPECIFIED: ICD-10-CM

## 2020-03-10 PROCEDURE — G0444 DEPRESSION SCREEN ANNUAL: CPT | Mod: NC,59

## 2020-03-10 PROCEDURE — 99396 PREV VISIT EST AGE 40-64: CPT | Mod: 25

## 2020-03-10 RX ORDER — TRAMADOL HYDROCHLORIDE 50 MG/1
50 TABLET, COATED ORAL
Qty: 28 | Refills: 0 | Status: COMPLETED | COMMUNITY
Start: 2019-05-15 | End: 2020-03-10

## 2020-03-10 NOTE — PHYSICAL EXAM
[No Acute Distress] : no acute distress [Well Nourished] : well nourished [Well Developed] : well developed [Well-Appearing] : well-appearing [Normal Sclera/Conjunctiva] : normal sclera/conjunctiva [PERRL] : pupils equal round and reactive to light [EOMI] : extraocular movements intact [Normal Outer Ear/Nose] : the outer ears and nose were normal in appearance [Normal Oropharynx] : the oropharynx was normal [No JVD] : no jugular venous distention [No Lymphadenopathy] : no lymphadenopathy [Supple] : supple [Thyroid Normal, No Nodules] : the thyroid was normal and there were no nodules present [No Respiratory Distress] : no respiratory distress  [No Accessory Muscle Use] : no accessory muscle use [Clear to Auscultation] : lungs were clear to auscultation bilaterally [Normal Rate] : normal rate  [Regular Rhythm] : with a regular rhythm [Normal S1, S2] : normal S1 and S2 [No Murmur] : no murmur heard [No Carotid Bruits] : no carotid bruits [No Abdominal Bruit] : a ~M bruit was not heard ~T in the abdomen [No Varicosities] : no varicosities [Pedal Pulses Present] : the pedal pulses are present [No Edema] : there was no peripheral edema [No Palpable Aorta] : no palpable aorta [No Extremity Clubbing/Cyanosis] : no extremity clubbing/cyanosis [Soft] : abdomen soft [Non Tender] : non-tender [Non-distended] : non-distended [No Masses] : no abdominal mass palpated [No HSM] : no HSM [Normal Bowel Sounds] : normal bowel sounds [Normal Posterior Cervical Nodes] : no posterior cervical lymphadenopathy [Normal Anterior Cervical Nodes] : no anterior cervical lymphadenopathy [No CVA Tenderness] : no CVA  tenderness [No Spinal Tenderness] : no spinal tenderness [No Joint Swelling] : no joint swelling [Grossly Normal Strength/Tone] : grossly normal strength/tone [No Rash] : no rash [Coordination Grossly Intact] : coordination grossly intact [No Focal Deficits] : no focal deficits [Normal Gait] : normal gait [Deep Tendon Reflexes (DTR)] : deep tendon reflexes were 2+ and symmetric [Normal Affect] : the affect was normal [Normal Insight/Judgement] : insight and judgment were intact [Normal Voice/Communication] : normal voice/communication [Normal TMs] : both tympanic membranes were normal [Declined Breast Exam] : declined breast exam  [No Hernias] : no hernias [de-identified] : (declined exam - done by Gyn recently)

## 2020-03-10 NOTE — ASSESSMENT
[FreeTextEntry1] : (1) HCM - discussed diet, exercise, weight maintenance.  Labs ordered as below.  Patient not fully fasting, and will have labs drawn at a [facility] lab at work this week.  U/A sent today.  HIV screening offered to patient and patient declined.  Hepatitis C screening negative 3/5/19.  Patient reports an influenza vaccination this past season.  She declines Tdap and Shingrix at this time.  Continue annual screening mammograms and Gyn visits as directed.  Patient is now amenable to screening colonoscopy and plans to call for an appointment for this year.  Patient given Health Care Proxy information today.\par \par (2) CV - patient on diltiazem for prior atrial tachycardia, and has been stable.  Follow-up with Dr. Mayer as needed.\par \par (3) Endo - follow-up with Dr. Kennedy for thyroid nodules.\par \par (4) Leg pains - unclear cause at this time.  Patient saw vascular last year and there was no DVT or insufficiency.  She wishes to pursue an inflammatory work-up - will send a CRP and ESR, and she can see rheumatology for an evaluation.  Will also send a Mg++ level.\par \par (5) Alprazolam refilled on patient request (for anxiety / insomnia).\par \par (6) Pulm - patient treated for pneumonia in January 2020 at Urgent Care.  Will send for a follow-up CXR.

## 2020-03-10 NOTE — HISTORY OF PRESENT ILLNESS
[de-identified] : Patient presents for a follow-up annual physical.\par \par Patient continues to have swelling in the feet.  She has seen vascular for this.\par \par Patient was treated for pneumonia January 2020 by Urgent Care.  She was treated with doxycycline.\par \par Patient declines Tdap and Shingrix.

## 2020-03-10 NOTE — HEALTH RISK ASSESSMENT
[Good] : ~his/her~  mood as  good [0] : 2) Feeling down, depressed, or hopeless: Not at all (0) [Patient reported mammogram was normal] : Patient reported mammogram was normal [Patient reported PAP Smear was normal] : Patient reported PAP Smear was normal [Patient reported bone density results were normal] : Patient reported bone density results were normal [Patient declined colonoscopy] : Patient declined colonoscopy [HIV test declined] : HIV test declined [Hepatitis C test offered] : Hepatitis C test offered [None] : None [With Family] : lives with family [Employed] : employed [] :  [Sexually Active] : sexually active [Feels Safe at Home] : Feels safe at home [Fully functional (bathing, dressing, toileting, transferring, walking, feeding)] : Fully functional (bathing, dressing, toileting, transferring, walking, feeding) [Fully functional (using the telephone, shopping, preparing meals, housekeeping, doing laundry, using] : Fully functional and needs no help or supervision to perform IADLs (using the telephone, shopping, preparing meals, housekeeping, doing laundry, using transportation, managing medications and managing finances) [Smoke Detector] : smoke detector [Carbon Monoxide Detector] : carbon monoxide detector [Seat Belt] :  uses seat belt [Sunscreen] : uses sunscreen [Reviewed no changes] : Reviewed no changes [Yes] : Yes [Never (0 pts)] : Never (0 points) [Monthly or less (1 pt)] : Monthly or less (1 point) [No] : In the past 12 months have you used drugs other than those required for medical reasons? No [Any fall with injury in past year] : Patient reported fall with injury in the past year [de-identified] : Social EtOH [de-identified] : Walking - 1 mile twice daily at work. [de-identified] : May 2019 - tripped on a curb at work. [[id number]] : 0 [Change in mental status noted] : No change in mental status noted [Language] : denies difficulty with language [Behavior] : denies difficulty with behavior [Handling Complex Tasks] : denies difficulty handling complex tasks [Reasoning] : denies difficulty with reasoning [High Risk Behavior] : no high risk behavior [Reports changes in hearing] : Reports no changes in hearing [Reports changes in vision] : Reports no changes in vision [Reports changes in dental health] : Reports no changes in dental health [MammogramDate] : 9/2019 [MammogramComments] : [facility] [PapSmearDate] : 11/2018 [PapSmearComments] : Dr. Mabel Gordon [BoneDensityDate] : 2014 [BoneDensityComments] : Will go in 2020 [ColonoscopyComments] : Was going to do FIT last year, but willing to see GI for a colonoscopy. 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 [de-identified] : Drives a car. [de-identified] : Hearing exam 2019 - L sided hearing loss. [de-identified] : Glasses distance - [employer] [de-identified] : Going regularly. [AdvancecareDate] : 03/05/2019 [FreeTextEntry4] : Patient given Health Care Proxy information today.

## 2020-03-11 LAB
APPEARANCE: CLEAR
BACTERIA: NEGATIVE
BILIRUBIN URINE: NEGATIVE
BLOOD URINE: NEGATIVE
COLOR: YELLOW
GLUCOSE QUALITATIVE U: NEGATIVE
HYALINE CASTS: 2 /LPF
KETONES URINE: NEGATIVE
LEUKOCYTE ESTERASE URINE: NEGATIVE
MICROSCOPIC-UA: NORMAL
NITRITE URINE: NEGATIVE
PH URINE: 6
PROTEIN URINE: NORMAL
RED BLOOD CELLS URINE: 1 /HPF
SPECIFIC GRAVITY URINE: 1.03
SQUAMOUS EPITHELIAL CELLS: 1 /HPF
UROBILINOGEN URINE: NORMAL
WHITE BLOOD CELLS URINE: 7 /HPF

## 2020-04-25 ENCOUNTER — MESSAGE (OUTPATIENT)
Age: 60
End: 2020-04-25

## 2020-05-04 ENCOUNTER — APPOINTMENT (OUTPATIENT)
Dept: DISASTER EMERGENCY | Facility: CLINIC | Age: 60
End: 2020-05-04

## 2020-05-05 LAB
SARS-COV-2 IGG SERPL IA-ACNC: <0.1 INDEX
SARS-COV-2 IGG SERPL QL IA: NEGATIVE

## 2020-07-06 ENCOUNTER — TRANSCRIPTION ENCOUNTER (OUTPATIENT)
Age: 60
End: 2020-07-06

## 2020-11-29 ENCOUNTER — TRANSCRIPTION ENCOUNTER (OUTPATIENT)
Age: 60
End: 2020-11-29

## 2020-12-22 ENCOUNTER — TRANSCRIPTION ENCOUNTER (OUTPATIENT)
Age: 60
End: 2020-12-22

## 2021-02-08 ENCOUNTER — RX RENEWAL (OUTPATIENT)
Age: 61
End: 2021-02-08

## 2021-02-24 ENCOUNTER — RX RENEWAL (OUTPATIENT)
Age: 61
End: 2021-02-24

## 2021-03-01 ENCOUNTER — RESULT REVIEW (OUTPATIENT)
Age: 61
End: 2021-03-01

## 2021-03-10 ENCOUNTER — NON-APPOINTMENT (OUTPATIENT)
Age: 61
End: 2021-03-10

## 2021-03-10 ENCOUNTER — APPOINTMENT (OUTPATIENT)
Dept: INTERNAL MEDICINE | Facility: CLINIC | Age: 61
End: 2021-03-10
Payer: COMMERCIAL

## 2021-03-10 VITALS
DIASTOLIC BLOOD PRESSURE: 70 MMHG | OXYGEN SATURATION: 99 % | SYSTOLIC BLOOD PRESSURE: 115 MMHG | HEIGHT: 70 IN | TEMPERATURE: 98.6 F | HEART RATE: 74 BPM

## 2021-03-10 DIAGNOSIS — R92.2 INCONCLUSIVE MAMMOGRAM: ICD-10-CM

## 2021-03-10 DIAGNOSIS — Z13.820 ENCOUNTER FOR SCREENING FOR OSTEOPOROSIS: ICD-10-CM

## 2021-03-10 DIAGNOSIS — Z87.898 PERSONAL HISTORY OF OTHER SPECIFIED CONDITIONS: ICD-10-CM

## 2021-03-10 DIAGNOSIS — Z86.79 PERSONAL HISTORY OF OTHER DISEASES OF THE CIRCULATORY SYSTEM: ICD-10-CM

## 2021-03-10 DIAGNOSIS — I49.1 ATRIAL PREMATURE DEPOLARIZATION: ICD-10-CM

## 2021-03-10 DIAGNOSIS — Z87.09 PERSONAL HISTORY OF OTHER DISEASES OF THE RESPIRATORY SYSTEM: ICD-10-CM

## 2021-03-10 DIAGNOSIS — L65.9 NONSCARRING HAIR LOSS, UNSPECIFIED: ICD-10-CM

## 2021-03-10 DIAGNOSIS — J15.5 PNEUMONIA DUE TO ESCHERICHIA COLI: ICD-10-CM

## 2021-03-10 DIAGNOSIS — Z23 ENCOUNTER FOR IMMUNIZATION: ICD-10-CM

## 2021-03-10 DIAGNOSIS — Z86.39 PERSONAL HISTORY OF OTHER ENDOCRINE, NUTRITIONAL AND METABOLIC DISEASE: ICD-10-CM

## 2021-03-10 DIAGNOSIS — M25.562 PAIN IN LEFT KNEE: ICD-10-CM

## 2021-03-10 PROCEDURE — 93000 ELECTROCARDIOGRAM COMPLETE: CPT | Mod: 59

## 2021-03-10 PROCEDURE — 99396 PREV VISIT EST AGE 40-64: CPT | Mod: 25

## 2021-03-10 PROCEDURE — 99072 ADDL SUPL MATRL&STAF TM PHE: CPT

## 2021-03-10 PROCEDURE — 36415 COLL VENOUS BLD VENIPUNCTURE: CPT

## 2021-03-10 PROCEDURE — G0444 DEPRESSION SCREEN ANNUAL: CPT | Mod: NC,59

## 2021-03-10 NOTE — HISTORY OF PRESENT ILLNESS
[FreeTextEntry1] : Establish care  [de-identified] : Ms. MARV MEREDITH is a 60 year old woman with pmhx of Atrial tachycardia, multiple thyroid nodules who presents for annual physical. She endorses being in good health and good mood. She denied any active complaints. She has not had mammography, us thyroid. She recently had colonoscopy. She received Flu vaccine and covid vaccine.

## 2021-03-10 NOTE — ASSESSMENT
[FreeTextEntry1] : (1) HCM - Encouraged diet, exercise, weight maintenance.  Labs ordered as below. HIV screening offered to patient in the past and patient declined.  Hepatitis C screening negative 3/5/19.  Patient reports an influenza vaccination this past season. She had both pfizer covid shots.  She declines Tdap and Shingrix at this time.  Continue annual screening mammograms and Gyn visits as directed. Colonoscopy report in the chart.  Patient given Health Care Proxy information today.\par \par (2) CV - patient on diltiazem for prior atrial tachycardia, and has been stable.  Follow-up with Dr. Mayer as needed.\par \par (3) Endo - follow-up with Dr. Kennedy for thyroid nodules.\par \par (4) Alprazolam refilled on patient request (for anxiety / insomnia).\par

## 2021-03-10 NOTE — COUNSELING
[Potential consequences of obesity discussed] : Potential consequences of obesity discussed [Benefits of weight loss discussed] : Benefits of weight loss discussed [Weigh Self Weekly] : weigh self weekly [Decrease Portions] : decrease portions

## 2021-03-10 NOTE — HEALTH RISK ASSESSMENT
[Good] : ~his/her~  mood as  good [Yes] : Yes [Monthly or less (1 pt)] : Monthly or less (1 point) [1 or 2 (0 pts)] : 1 or 2 (0 points) [Never (0 pts)] : Never (0 points) [No] : In the past 12 months have you used drugs other than those required for medical reasons? No [No falls in past year] : Patient reported no falls in the past year [0] : 2) Feeling down, depressed, or hopeless: Not at all (0) [Patient reported mammogram was normal] : Patient reported mammogram was normal [Patient reported PAP Smear was normal] : Patient reported PAP Smear was normal [Patient reported bone density results were normal] : Patient reported bone density results were normal [HIV test declined] : HIV test declined [Hepatitis C test offered] : Hepatitis C test offered [None] : None [With Family] : lives with family [Employed] : employed [] :  [Sexually Active] : sexually active [Feels Safe at Home] : Feels safe at home [Fully functional (bathing, dressing, toileting, transferring, walking, feeding)] : Fully functional (bathing, dressing, toileting, transferring, walking, feeding) [Fully functional (using the telephone, shopping, preparing meals, housekeeping, doing laundry, using] : Fully functional and needs no help or supervision to perform IADLs (using the telephone, shopping, preparing meals, housekeeping, doing laundry, using transportation, managing medications and managing finances) [Smoke Detector] : smoke detector [Carbon Monoxide Detector] : carbon monoxide detector [Seat Belt] :  uses seat belt [Sunscreen] : uses sunscreen [Patient reported colonoscopy was normal] : Patient reported colonoscopy was normal [With Patient/Caregiver] : With Patient/Caregiver [No Retinopathy] : No retinopathy [] : No [de-identified] : Social EtOH [Audit-CScore] : 1 [de-identified] : Walking [de-identified] : Balanced [de-identified] : May 2019 - tripped on a curb at work. [[id number]] : 0 [EyeExamDate] : 01/21 [Change in mental status noted] : No change in mental status noted [Language] : denies difficulty with language [Behavior] : denies difficulty with behavior [Handling Complex Tasks] : denies difficulty handling complex tasks [Reasoning] : denies difficulty with reasoning [High Risk Behavior] : no high risk behavior [Reports changes in hearing] : Reports no changes in hearing [Reports changes in vision] : Reports no changes in vision [Reports changes in dental health] : Reports no changes in dental health [MammogramDate] : 01/2020 [MammogramComments] : [facility] [PapSmearDate] : 11/2018 [PapSmearComments] : Dr. 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 [de-identified] : Drives a car. [de-identified] : Hearing exam 2019 - L sided hearing loss. [de-identified] : Glasses distance - [employer] [de-identified] : Going regularly. [AdvancecareDate] : 03/21 [FreeTextEntry4] : Patient given Health Care Proxy information today.

## 2021-03-10 NOTE — PHYSICAL EXAM
[No Acute Distress] : no acute distress [Well Nourished] : well nourished [Well Developed] : well developed [Normal Sclera/Conjunctiva] : normal sclera/conjunctiva [EOMI] : extraocular movements intact [No JVD] : no jugular venous distention [No Respiratory Distress] : no respiratory distress  [Clear to Auscultation] : lungs were clear to auscultation bilaterally [Normal Rate] : normal rate  [Regular Rhythm] : with a regular rhythm [Normal S1, S2] : normal S1 and S2 [No Murmur] : no murmur heard [Pedal Pulses Present] : the pedal pulses are present [No Edema] : there was no peripheral edema [No Extremity Clubbing/Cyanosis] : no extremity clubbing/cyanosis [Soft] : abdomen soft [Non Tender] : non-tender [Normal Bowel Sounds] : normal bowel sounds [Normal Posterior Cervical Nodes] : no posterior cervical lymphadenopathy [Normal Anterior Cervical Nodes] : no anterior cervical lymphadenopathy [No CVA Tenderness] : no CVA  tenderness [No Spinal Tenderness] : no spinal tenderness [No Joint Swelling] : no joint swelling [Grossly Normal Strength/Tone] : grossly normal strength/tone [No Rash] : no rash [Coordination Grossly Intact] : coordination grossly intact [No Focal Deficits] : no focal deficits [Normal Gait] : normal gait [Normal Affect] : the affect was normal [Normal Insight/Judgement] : insight and judgment were intact [Comprehensive Foot Exam Normal] : Right and left foot were examined and both feet are normal. No ulcers in either foot. Toes are normal and with full ROM.  Normal tactile sensation with monofilament testing throughout both feet

## 2021-03-11 LAB
25(OH)D3 SERPL-MCNC: 23.4 NG/ML
ALBUMIN SERPL ELPH-MCNC: 4.7 G/DL
ALP BLD-CCNC: 83 U/L
ALT SERPL-CCNC: 10 U/L
ANION GAP SERPL CALC-SCNC: 13 MMOL/L
APPEARANCE: CLEAR
AST SERPL-CCNC: 17 U/L
BACTERIA: NEGATIVE
BASOPHILS # BLD AUTO: 0.12 K/UL
BASOPHILS NFR BLD AUTO: 1.4 %
BILIRUB SERPL-MCNC: 0.2 MG/DL
BILIRUBIN URINE: NEGATIVE
BLOOD URINE: NEGATIVE
BUN SERPL-MCNC: 16 MG/DL
CALCIUM SERPL-MCNC: 9.5 MG/DL
CHLORIDE SERPL-SCNC: 104 MMOL/L
CHOLEST SERPL-MCNC: 218 MG/DL
CO2 SERPL-SCNC: 23 MMOL/L
COLOR: YELLOW
CREAT SERPL-MCNC: 0.89 MG/DL
EOSINOPHIL # BLD AUTO: 0.2 K/UL
EOSINOPHIL NFR BLD AUTO: 2.3 %
ESTIMATED AVERAGE GLUCOSE: 105 MG/DL
GLUCOSE QUALITATIVE U: NEGATIVE
GLUCOSE SERPL-MCNC: 92 MG/DL
HBA1C MFR BLD HPLC: 5.3 %
HCT VFR BLD CALC: 45.1 %
HDLC SERPL-MCNC: 64 MG/DL
HGB BLD-MCNC: 14.5 G/DL
HYALINE CASTS: 0 /LPF
IMM GRANULOCYTES NFR BLD AUTO: 0.3 %
KETONES URINE: NEGATIVE
LDLC SERPL CALC-MCNC: 132 MG/DL
LEUKOCYTE ESTERASE URINE: NEGATIVE
LYMPHOCYTES # BLD AUTO: 2.48 K/UL
LYMPHOCYTES NFR BLD AUTO: 28.2 %
MAN DIFF?: NORMAL
MCHC RBC-ENTMCNC: 28.8 PG
MCHC RBC-ENTMCNC: 32.2 GM/DL
MCV RBC AUTO: 89.5 FL
MICROSCOPIC-UA: NORMAL
MONOCYTES # BLD AUTO: 0.67 K/UL
MONOCYTES NFR BLD AUTO: 7.6 %
NEUTROPHILS # BLD AUTO: 5.28 K/UL
NEUTROPHILS NFR BLD AUTO: 60.2 %
NITRITE URINE: NEGATIVE
NONHDLC SERPL-MCNC: 154 MG/DL
PH URINE: 6
PLATELET # BLD AUTO: 347 K/UL
POTASSIUM SERPL-SCNC: 4.8 MMOL/L
PROT SERPL-MCNC: 7.5 G/DL
PROTEIN URINE: NORMAL
RBC # BLD: 5.04 M/UL
RBC # FLD: 13.5 %
RED BLOOD CELLS URINE: 1 /HPF
SODIUM SERPL-SCNC: 139 MMOL/L
SPECIFIC GRAVITY URINE: 1.03
SQUAMOUS EPITHELIAL CELLS: 1 /HPF
T4 FREE SERPL-MCNC: 1.2 NG/DL
TRIGL SERPL-MCNC: 108 MG/DL
TSH SERPL-ACNC: 3.9 UIU/ML
UROBILINOGEN URINE: NORMAL
VIT B12 SERPL-MCNC: 556 PG/ML
WBC # FLD AUTO: 8.78 K/UL
WHITE BLOOD CELLS URINE: 5 /HPF

## 2021-06-23 ENCOUNTER — NON-APPOINTMENT (OUTPATIENT)
Age: 61
End: 2021-06-23

## 2021-06-23 ENCOUNTER — APPOINTMENT (OUTPATIENT)
Dept: DERMATOLOGY | Facility: CLINIC | Age: 61
End: 2021-06-23
Payer: COMMERCIAL

## 2021-06-23 ENCOUNTER — LABORATORY RESULT (OUTPATIENT)
Age: 61
End: 2021-06-23

## 2021-06-23 VITALS — HEIGHT: 71 IN | BODY MASS INDEX: 30.8 KG/M2 | WEIGHT: 220 LBS

## 2021-06-23 DIAGNOSIS — L81.4 OTHER MELANIN HYPERPIGMENTATION: ICD-10-CM

## 2021-06-23 DIAGNOSIS — L82.1 OTHER SEBORRHEIC KERATOSIS: ICD-10-CM

## 2021-06-23 DIAGNOSIS — D18.01 HEMANGIOMA OF SKIN AND SUBCUTANEOUS TISSUE: ICD-10-CM

## 2021-06-23 DIAGNOSIS — D48.5 NEOPLASM OF UNCERTAIN BEHAVIOR OF SKIN: ICD-10-CM

## 2021-06-23 PROCEDURE — 11102 TANGNTL BX SKIN SINGLE LES: CPT

## 2021-06-23 PROCEDURE — 99203 OFFICE O/P NEW LOW 30 MIN: CPT | Mod: 25

## 2021-06-23 PROCEDURE — 11103 TANGNTL BX SKIN EA SEP/ADDL: CPT | Mod: 76

## 2021-06-23 PROCEDURE — 99072 ADDL SUPL MATRL&STAF TM PHE: CPT

## 2021-08-17 ENCOUNTER — NON-APPOINTMENT (OUTPATIENT)
Age: 61
End: 2021-08-17

## 2021-09-09 DIAGNOSIS — Z12.39 ENCOUNTER FOR OTHER SCREENING FOR MALIGNANT NEOPLASM OF BREAST: ICD-10-CM

## 2021-09-21 ENCOUNTER — APPOINTMENT (OUTPATIENT)
Dept: ULTRASOUND IMAGING | Facility: CLINIC | Age: 61
End: 2021-09-21
Payer: COMMERCIAL

## 2021-09-21 ENCOUNTER — APPOINTMENT (OUTPATIENT)
Dept: RADIOLOGY | Facility: CLINIC | Age: 61
End: 2021-09-21
Payer: COMMERCIAL

## 2021-09-21 ENCOUNTER — APPOINTMENT (OUTPATIENT)
Dept: MAMMOGRAPHY | Facility: CLINIC | Age: 61
End: 2021-09-21
Payer: COMMERCIAL

## 2021-09-21 PROCEDURE — 77063 BREAST TOMOSYNTHESIS BI: CPT

## 2021-09-21 PROCEDURE — 77067 SCR MAMMO BI INCL CAD: CPT

## 2021-09-21 PROCEDURE — 77080 DXA BONE DENSITY AXIAL: CPT

## 2021-09-21 PROCEDURE — 76641 ULTRASOUND BREAST COMPLETE: CPT | Mod: 50

## 2021-09-29 ENCOUNTER — RESULT REVIEW (OUTPATIENT)
Age: 61
End: 2021-09-29

## 2021-12-23 ENCOUNTER — TRANSCRIPTION ENCOUNTER (OUTPATIENT)
Age: 61
End: 2021-12-23

## 2021-12-25 ENCOUNTER — TRANSCRIPTION ENCOUNTER (OUTPATIENT)
Age: 61
End: 2021-12-25

## 2022-01-12 ENCOUNTER — APPOINTMENT (OUTPATIENT)
Dept: INTERNAL MEDICINE | Facility: CLINIC | Age: 62
End: 2022-01-12
Payer: COMMERCIAL

## 2022-01-12 DIAGNOSIS — Z23 ENCOUNTER FOR IMMUNIZATION: ICD-10-CM

## 2022-01-12 PROCEDURE — 90471 IMMUNIZATION ADMIN: CPT

## 2022-01-12 PROCEDURE — 90715 TDAP VACCINE 7 YRS/> IM: CPT

## 2022-02-07 ENCOUNTER — NON-APPOINTMENT (OUTPATIENT)
Age: 62
End: 2022-02-07

## 2022-02-09 ENCOUNTER — NON-APPOINTMENT (OUTPATIENT)
Age: 62
End: 2022-02-09

## 2022-03-11 ENCOUNTER — LABORATORY RESULT (OUTPATIENT)
Age: 62
End: 2022-03-11

## 2022-03-11 ENCOUNTER — NON-APPOINTMENT (OUTPATIENT)
Age: 62
End: 2022-03-11

## 2022-03-11 ENCOUNTER — APPOINTMENT (OUTPATIENT)
Dept: INTERNAL MEDICINE | Facility: CLINIC | Age: 62
End: 2022-03-11
Payer: COMMERCIAL

## 2022-03-11 VITALS
BODY MASS INDEX: 32.34 KG/M2 | HEART RATE: 86 BPM | SYSTOLIC BLOOD PRESSURE: 115 MMHG | TEMPERATURE: 97.8 F | OXYGEN SATURATION: 98 % | DIASTOLIC BLOOD PRESSURE: 70 MMHG | WEIGHT: 231 LBS | HEIGHT: 71 IN

## 2022-03-11 DIAGNOSIS — Z78.9 OTHER SPECIFIED HEALTH STATUS: ICD-10-CM

## 2022-03-11 DIAGNOSIS — U07.1 COVID-19: ICD-10-CM

## 2022-03-11 DIAGNOSIS — F41.9 ANXIETY DISORDER, UNSPECIFIED: ICD-10-CM

## 2022-03-11 PROCEDURE — G0444 DEPRESSION SCREEN ANNUAL: CPT | Mod: 59

## 2022-03-11 PROCEDURE — 99396 PREV VISIT EST AGE 40-64: CPT | Mod: 25

## 2022-03-11 PROCEDURE — 93000 ELECTROCARDIOGRAM COMPLETE: CPT | Mod: 59

## 2022-03-17 ENCOUNTER — RX RENEWAL (OUTPATIENT)
Age: 62
End: 2022-03-17

## 2022-03-21 PROBLEM — Z78.9 CONSUMES ALCOHOL OCCASIONALLY: Status: ACTIVE | Noted: 2019-05-15

## 2022-03-21 PROBLEM — Z78.9 CURRENT NON-SMOKER: Status: ACTIVE | Noted: 2019-05-15

## 2022-03-21 PROBLEM — Z78.9 DOES NOT USE ILLICIT DRUGS: Status: ACTIVE | Noted: 2019-05-15

## 2022-03-22 LAB
APPEARANCE: CLEAR
BILIRUBIN URINE: NEGATIVE
BLOOD URINE: NEGATIVE
COLOR: NORMAL
GLUCOSE QUALITATIVE U: NEGATIVE
KETONES URINE: NEGATIVE
LEUKOCYTE ESTERASE URINE: ABNORMAL
NITRITE URINE: NEGATIVE
PH URINE: 6
PROTEIN URINE: NEGATIVE
SPECIFIC GRAVITY URINE: 1.02
UROBILINOGEN URINE: NORMAL

## 2022-03-24 PROBLEM — U07.1 COVID-19 VIRUS INFECTION: Status: RESOLVED | Noted: 2022-03-24 | Resolved: 2022-03-24

## 2022-03-24 NOTE — HISTORY OF PRESENT ILLNESS
[de-identified] : Patient presents for a follow-up annual physical.  Patient is a 61 year old female with a history of paroxysmal SVT, anxiety.  She had a COVID-19 infection December 2021, and her recovery has been slow.  She had pneumonia in January 2020 treated in Urgent Care.  She continues to take diltiazem for the PSVT.  She feels symptomatic if she misses the diltiazem.  She sees  Dr. Mayer ().\par \par Patient received a 3rd dose of the Pfizer COVID-19 vaccine 2/10/2022.  She had the influenza vaccination September 2021.  She had Tdap 1/12/2022.  Patient declines Shingrix.\par \par Patient continues to have swelling (but not pain) in the feet.  She has seen vascular for this.\par \par Patient is  and has 2 children.  She works for [employer].  Patient's daughter had twins 3 weeks ago.

## 2022-03-24 NOTE — HEALTH RISK ASSESSMENT
[Good] : ~his/her~  mood as  good [Yes] : Yes [Monthly or less (1 pt)] : Monthly or less (1 point) [Never (0 pts)] : Never (0 points) [No] : In the past 12 months have you used drugs other than those required for medical reasons? No [0] : 2) Feeling down, depressed, or hopeless: Not at all (0) [Patient reported mammogram was normal] : Patient reported mammogram was normal [Patient reported PAP Smear was normal] : Patient reported PAP Smear was normal [Patient reported bone density results were normal] : Patient reported bone density results were normal [Patient declined colonoscopy] : Patient declined colonoscopy [HIV test declined] : HIV test declined [Hepatitis C test offered] : Hepatitis C test offered [None] : None [With Family] : lives with family [Employed] : employed [] :  [Sexually Active] : sexually active [Feels Safe at Home] : Feels safe at home [Fully functional (bathing, dressing, toileting, transferring, walking, feeding)] : Fully functional (bathing, dressing, toileting, transferring, walking, feeding) [Fully functional (using the telephone, shopping, preparing meals, housekeeping, doing laundry, using] : Fully functional and needs no help or supervision to perform IADLs (using the telephone, shopping, preparing meals, housekeeping, doing laundry, using transportation, managing medications and managing finances) [Smoke Detector] : smoke detector [Carbon Monoxide Detector] : carbon monoxide detector [Seat Belt] :  uses seat belt [Sunscreen] : uses sunscreen [No falls in past year] : Patient reported no falls in the past year [PHQ-2 Negative - No further assessment needed] : PHQ-2 Negative - No further assessment needed [Designated Healthcare Proxy] : Designated healthcare proxy [Name: ___] : Health Care Proxy's Name: [unfilled]  [Relationship: ___] : Relationship: [unfilled] [Never] : Never [de-identified] : Social EtOH [de-identified] : Walking - 1 mile twice daily at work. [de-identified] : May 2019 - tripped on a curb at work. [[id number]] : 0 [Change in mental status noted] : No change in mental status noted [Language] : denies difficulty with language [Behavior] : denies difficulty with behavior [Handling Complex Tasks] : denies difficulty handling complex tasks [Reasoning] : denies difficulty with reasoning [High Risk Behavior] : no high risk behavior [Reports changes in hearing] : Reports no changes in hearing [Reports changes in vision] : Reports no changes in vision [Reports changes in dental health] : Reports no changes in dental health [MammogramDate] : 9/21 [MammogramComments] : 9/21/2021 - BIRADS-1 [PapSmearDate] : 09/21 [PapSmearComments] : Dr. Mabel Gordon [BoneDensityDate] : 09/21 [BoneDensityComments] : Femoral neck -1.2 [ColonoscopyDate] : 3/21 [ColonoscopyComments] : 3/1/2021 - Dr. Tyra Julio, multiple polyps.  2 year follow-up. 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 [de-identified] : Drives a car. [de-identified] : Hearing exam 2019 - L sided hearing loss. [de-identified] : Glasses distance - [employer].  Going regularly. [de-identified] : Going regularly. [AdvancecareDate] : 03/11/2022 [FreeTextEntry4] : Patient given Health Care Proxy information today.

## 2022-03-24 NOTE — PHYSICAL EXAM
[No Acute Distress] : no acute distress [Well Nourished] : well nourished [Well Developed] : well developed [Well-Appearing] : well-appearing [Normal Sclera/Conjunctiva] : normal sclera/conjunctiva [PERRL] : pupils equal round and reactive to light [EOMI] : extraocular movements intact [Normal Outer Ear/Nose] : the outer ears and nose were normal in appearance [Normal Oropharynx] : the oropharynx was normal [No JVD] : no jugular venous distention [No Lymphadenopathy] : no lymphadenopathy [Supple] : supple [Thyroid Normal, No Nodules] : the thyroid was normal and there were no nodules present [No Respiratory Distress] : no respiratory distress  [No Accessory Muscle Use] : no accessory muscle use [Clear to Auscultation] : lungs were clear to auscultation bilaterally [Normal Rate] : normal rate  [Regular Rhythm] : with a regular rhythm [Normal S1, S2] : normal S1 and S2 [No Murmur] : no murmur heard [No Carotid Bruits] : no carotid bruits [No Abdominal Bruit] : a ~M bruit was not heard ~T in the abdomen [No Varicosities] : no varicosities [Pedal Pulses Present] : the pedal pulses are present [No Edema] : there was no peripheral edema [No Palpable Aorta] : no palpable aorta [No Extremity Clubbing/Cyanosis] : no extremity clubbing/cyanosis [Soft] : abdomen soft [Non Tender] : non-tender [Non-distended] : non-distended [No Masses] : no abdominal mass palpated [No HSM] : no HSM [Normal Bowel Sounds] : normal bowel sounds [Normal Posterior Cervical Nodes] : no posterior cervical lymphadenopathy [Normal Anterior Cervical Nodes] : no anterior cervical lymphadenopathy [No CVA Tenderness] : no CVA  tenderness [No Spinal Tenderness] : no spinal tenderness [No Joint Swelling] : no joint swelling [Grossly Normal Strength/Tone] : grossly normal strength/tone [No Rash] : no rash [Coordination Grossly Intact] : coordination grossly intact [No Focal Deficits] : no focal deficits [Normal Gait] : normal gait [Deep Tendon Reflexes (DTR)] : deep tendon reflexes were 2+ and symmetric [Normal Affect] : the affect was normal [Normal Insight/Judgement] : insight and judgment were intact [Normal Voice/Communication] : normal voice/communication [Normal TMs] : both tympanic membranes were normal [Declined Breast Exam] : declined breast exam

## 2022-03-24 NOTE — ASSESSMENT
[FreeTextEntry1] : (1) HCM - discussed diet, exercise, weight maintenance.  Labs ordered as below.  Patient not fully fasting, and will have labs drawn at a [facility] lab at work.  U/A sent today.  HIV screening offered to patient and patient declined.  Hepatitis C screening negative 3/5/19.  Patient has had 3 doses of the Pfizer COVID-19 vaccine.  Patient reports an influenza vaccination this past season.  Tdap UTD 1/12/2022.  She declines Shingrix at this time.  Continue annual screening mammograms and Gyn visits as directed.  Patient is now amenable to screening colonoscopy and plans to call for an appointment for this year.  Patient given Health Care Proxy information today.\par \par (2) CV - patient on diltiazem for prior atrial tachycardia, and has been stable.  Follow-up with Dr. Mayer as needed.\par \par (3) Endo - follow-up with Dr. Kennedy for thyroid nodules.\par \par (4) Alprazolam refilled on patient request (for anxiety / insomnia).\par \par (5) Pulm - s/p COVID-19, with slow recovery.  Patient back at work, and will ensure that she gets adequate sleep nightly.  Patient treated for pneumonia in January 2020 at Urgent Care.

## 2022-04-29 ENCOUNTER — LABORATORY RESULT (OUTPATIENT)
Age: 62
End: 2022-04-29

## 2022-05-05 ENCOUNTER — APPOINTMENT (OUTPATIENT)
Dept: ENDOCRINOLOGY | Facility: CLINIC | Age: 62
End: 2022-05-05
Payer: COMMERCIAL

## 2022-05-05 VITALS
BODY MASS INDEX: 32.2 KG/M2 | HEIGHT: 71 IN | HEART RATE: 84 BPM | OXYGEN SATURATION: 97 % | WEIGHT: 230 LBS | TEMPERATURE: 96.8 F | DIASTOLIC BLOOD PRESSURE: 76 MMHG | SYSTOLIC BLOOD PRESSURE: 120 MMHG

## 2022-05-05 PROCEDURE — 99214 OFFICE O/P EST MOD 30 MIN: CPT

## 2022-05-05 NOTE — PHYSICAL EXAM
[Alert] : alert [Healthy Appearance] : healthy appearance [No Acute Distress] : no acute distress [Normal Sclera/Conjunctiva] : normal sclera/conjunctiva [No Proptosis] : no proptosis [No Respiratory Distress] : no respiratory distress [Clear to Auscultation] : lungs were clear to auscultation bilaterally [Normal S1, S2] : normal S1 and S2 [Regular Rhythm] : with a regular rhythm [No Edema] : no peripheral edema [Normal Bowel Sounds] : normal bowel sounds [Soft] : abdomen soft [No Spinal Tenderness] : no spinal tenderness [No Involuntary Movements] : no involuntary movements were seen [Normal Strength/Tone] : muscle strength and tone were normal [Normal Reflexes] : deep tendon reflexes were 2+ and symmetric [No Tremors] : no tremors [Normal Affect] : the affect was normal [Normal Mood] : the mood was normal [de-identified] : left thyroid nodule ~ 2 cm

## 2022-05-05 NOTE — HISTORY OF PRESENT ILLNESS
[FreeTextEntry1] : cc: hypothyrroidism\par \par 61 year old woman with hypothyroidism previously on levothyroxine. She had been feeling well, then had Covid in December and since then has been feeling extremely tired.  She recently saw her PMD for annual appt, had TFTs checked and TSH was elevated.\par \par Also noted to have very low vitamin D.  Not taking vitamin D\par \par She also has a thyroid nodule, has not noted any change in her neck, no dyspnea or dysphagia,  Had an ultrasound about 6 months ago

## 2022-05-31 ENCOUNTER — APPOINTMENT (OUTPATIENT)
Dept: ULTRASOUND IMAGING | Facility: IMAGING CENTER | Age: 62
End: 2022-05-31
Payer: COMMERCIAL

## 2022-05-31 ENCOUNTER — OUTPATIENT (OUTPATIENT)
Dept: OUTPATIENT SERVICES | Facility: HOSPITAL | Age: 62
LOS: 1 days | End: 2022-05-31
Payer: COMMERCIAL

## 2022-05-31 DIAGNOSIS — Z00.8 ENCOUNTER FOR OTHER GENERAL EXAMINATION: ICD-10-CM

## 2022-05-31 PROCEDURE — 76536 US EXAM OF HEAD AND NECK: CPT | Mod: 26

## 2022-05-31 PROCEDURE — 76536 US EXAM OF HEAD AND NECK: CPT

## 2022-06-08 ENCOUNTER — NON-APPOINTMENT (OUTPATIENT)
Age: 62
End: 2022-06-08

## 2022-10-03 NOTE — ASSESSMENT
[FreeTextEntry1] : 61 year old woman with subclinical hypothyroidism, with symptoms\par  - resume levothyroxine 50 micrograms daily\par  - Repeat tfts in 2 months\par \par Vitamin D def'y -\par - Start vitamin D 50,000 units once weekly\par \par \par Thyroid nodules: Stable on ultrasound, nondiagnostic FNA in the past x 2 (2010 and 2012).  Repeat ultrasound  to monitor for change\par \par \par f/u 1 year with repeat labs in 2 months\par  no

## 2022-11-26 ENCOUNTER — NON-APPOINTMENT (OUTPATIENT)
Age: 62
End: 2022-11-26

## 2022-12-13 ENCOUNTER — NON-APPOINTMENT (OUTPATIENT)
Age: 62
End: 2022-12-13

## 2022-12-13 ENCOUNTER — APPOINTMENT (OUTPATIENT)
Dept: ORTHOPEDIC SURGERY | Facility: CLINIC | Age: 62
End: 2022-12-13
Payer: COMMERCIAL

## 2022-12-13 VITALS — WEIGHT: 230 LBS | BODY MASS INDEX: 32.2 KG/M2 | HEIGHT: 71 IN

## 2022-12-13 DIAGNOSIS — M25.552 PAIN IN LEFT HIP: ICD-10-CM

## 2022-12-13 DIAGNOSIS — M70.62 TROCHANTERIC BURSITIS, LEFT HIP: ICD-10-CM

## 2022-12-13 PROCEDURE — 99204 OFFICE O/P NEW MOD 45 MIN: CPT | Mod: 25

## 2022-12-13 PROCEDURE — 73522 X-RAY EXAM HIPS BI 3-4 VIEWS: CPT

## 2022-12-13 PROCEDURE — 20610 DRAIN/INJ JOINT/BURSA W/O US: CPT | Mod: LT

## 2022-12-13 NOTE — HISTORY OF PRESENT ILLNESS
[de-identified] : Ms. MARV MEREDITH is a 62 year female who presents to office complaining of left hip pain x 2 months.\par Denies specific injury, trauma, or fall to the hip.\par She describes a constant dull/achy pain localized to the lateral aspect of her hip.\par This pain becomes intermittently sharp, particularly with sleeping on the affected hip.\par Patient has tried rest and Advil, which have not seemed to help the pain.\par Denies associated radiating pain or distal neuropathy.\par All review of systems, family history, social history, surgical history, past medical history, medications, and allergies not previously stated as positive are negative. They were reviewed by me today with the patient and documented accordingly.

## 2022-12-13 NOTE — DISCUSSION/SUMMARY
[de-identified] : Following the local injection the pain was gone.  At this time the patient in the past has gotten some relief from diclofenac.  She is given a prescription for diclofenac which she can take as necessary 75 mg tablets were given in the they can be taken once or twice a day as needed.  We will follow her conservatively return visit in 3 months as needed.

## 2022-12-13 NOTE — PROCEDURE
[de-identified] : Procedure Note: \par \par Anatomic Location: left  hip trochanteric bursitis\par \par Diagnosis:  left hip trochanteric bursitis\par \par Procedure:  Injection of 6ccs of Marcaine 0.5% plain and Kenalog 40, 1 cc\par Local Spray: Ethyl Chloride.\par \par Skin preparation with alcohol.\par \par Patient has consented for the procedure.\par \par Injection 22-gauge spinal needle  through a direct lateral approach.\par \par Patient tolerated the procedure well.\par \par Patient instructed to call the office if any reaction, fever, chills, increased erythema or swelling.   [phone].

## 2022-12-13 NOTE — PHYSICAL EXAM
[de-identified] : Constitutional - the patient is of normal build and overweight with a BMI of 32.  She is a nurse at the [employer]..  The patient remains oriented to person, time, and  place.  Mood is normal. Vital signs as recorded.  The patients gait is WNL.  When trying to lie on her side at any time is on the left she has pain over the lateral aspect of her hip.  The patient has satisfactory  balance and can stand on toes and heels.\par \par The patient has no difficulty with respiration. Respiration at rest is a normal rate. The patient is not short of breath and has not become short of breath with short ambulation. There is no audible wheezing. No coughing.\par \par Skin is normal for the patient's age. There are no abnormal masses or lymph nodes which stand out in the lower extremities.\par \par Spine - deep tendon reflexes are symmetric. Motor and sensory are symmetric.\par \par \par UPPER EXTREMITIES \par \par Shoulders ROM  is symmetric  and the motion is satisfactory.  There is no significant shoulder pain or limitation in motion which would make using a cane or a walker difficult. Shoulder stability and  strength are satisfactory.\par \par There is normal motion in the wrists and elbows.\par \par Circulation appears satisfactory with pedal pulses present.  There is no major edema in the lower legs. No skin tenderness or increased temperature. No major varicosities.\par \par HIP EXAMINATION the abduction and abduction as well as rotation measurements were taken with the hip in flexion.\par \par Motion\par There is symmetric motion with flexion 135 degrees, abduction 80 degrees, adduction 30 degrees, external rotation 80 degrees, internal rotation 20 degrees.  She has definite pain with any palpation over left greater trochanter and when laying on her left side over the trochanter.\par \par The hips have good range of motion. There is good strength across the hips. There is no crepitus in either hip. The alignment of the hips is normal.\par \par \par KNEE EXAMINATION\par \par Motion\par Right Knee has 0 to 135 degrees of motion with good medial  lateral and anterior posterior stability.  There is no major effusion.  There is no Baker's cyst.  There is no significant patellofemoral crepitus.  The patient has satisfactory strength across the knee.               \par Left  Knee   has 0 to 135 degrees of motion with good medial lateral and anterior posterior stability.  There is no major effusion there is no Baker's cyst.  There is no significant patellofemoral crepitus.  The patient has satisfactory strength across the knee.            \par \par Ankle and foot examination\par Of the ankle has normal motion.  There is normal ankle stability.  The patient has no major abnormalities of the foot.\par \par \par \par  [de-identified] : An AP of the pelvis and lateral of the right left hip show both femoral heads around joint spaces maintained no significant arthritis is present.  This does not exclude trochanteric bursitis.

## 2023-01-17 ENCOUNTER — OUTPATIENT (OUTPATIENT)
Dept: OUTPATIENT SERVICES | Facility: HOSPITAL | Age: 63
LOS: 1 days | End: 2023-01-17
Payer: COMMERCIAL

## 2023-01-17 VITALS
TEMPERATURE: 99 F | WEIGHT: 229.94 LBS | OXYGEN SATURATION: 96 % | DIASTOLIC BLOOD PRESSURE: 85 MMHG | SYSTOLIC BLOOD PRESSURE: 127 MMHG | HEART RATE: 68 BPM | HEIGHT: 71 IN | RESPIRATION RATE: 18 BRPM

## 2023-01-17 DIAGNOSIS — N95.0 POSTMENOPAUSAL BLEEDING: ICD-10-CM

## 2023-01-17 DIAGNOSIS — Z98.890 OTHER SPECIFIED POSTPROCEDURAL STATES: Chronic | ICD-10-CM

## 2023-01-17 PROCEDURE — 86901 BLOOD TYPING SEROLOGIC RH(D): CPT

## 2023-01-17 PROCEDURE — G0463: CPT

## 2023-01-17 PROCEDURE — 85027 COMPLETE CBC AUTOMATED: CPT

## 2023-01-17 PROCEDURE — 86900 BLOOD TYPING SEROLOGIC ABO: CPT

## 2023-01-17 PROCEDURE — 86850 RBC ANTIBODY SCREEN: CPT

## 2023-01-17 PROCEDURE — 80048 BASIC METABOLIC PNL TOTAL CA: CPT

## 2023-01-17 PROCEDURE — 36415 COLL VENOUS BLD VENIPUNCTURE: CPT

## 2023-01-17 RX ORDER — SODIUM CHLORIDE 9 MG/ML
1000 INJECTION, SOLUTION INTRAVENOUS
Refills: 0 | Status: DISCONTINUED | OUTPATIENT
Start: 2023-01-23 | End: 2023-02-06

## 2023-01-17 NOTE — H&P PST ADULT - NSANTHOSAYNRD_GEN_A_CORE
No. ZENIA screening performed.  STOP BANG Legend: 0-2 = LOW Risk; 3-4 = INTERMEDIATE Risk; 5-8 = HIGH Risk

## 2023-01-17 NOTE — H&P PST ADULT - HISTORY OF PRESENT ILLNESS
62yr old female  with hx of postmenopausal bleeding. Coming for D&C Dx hysteroscopy possible polypectomy possible myosure. Hx sig for occas irreg heart beat on Diltiazem. Work-up neg last EKG and echo on chart. BMI 32.1. Hx of covid 2020  and 2022. second time mild symptoms.    Note; covid test 2023 [facility]

## 2023-01-17 NOTE — H&P PST ADULT - ATTENDING COMMENTS
Patient presented with postmenopausal bleeding. Ultrasound and SHG reveal a possible polypoid growth in the endometrial cavity. She presents for more definitive assessment with D&C hysteroscopy possible polypectomy and myosure.

## 2023-01-20 ENCOUNTER — OUTPATIENT (OUTPATIENT)
Dept: OUTPATIENT SERVICES | Facility: HOSPITAL | Age: 63
LOS: 1 days | End: 2023-01-20
Payer: COMMERCIAL

## 2023-01-20 DIAGNOSIS — Z98.890 OTHER SPECIFIED POSTPROCEDURAL STATES: Chronic | ICD-10-CM

## 2023-01-20 DIAGNOSIS — Z11.52 ENCOUNTER FOR SCREENING FOR COVID-19: ICD-10-CM

## 2023-01-20 LAB — SARS-COV-2 RNA SPEC QL NAA+PROBE: SIGNIFICANT CHANGE UP

## 2023-01-20 PROCEDURE — U0003: CPT

## 2023-01-20 PROCEDURE — C9803: CPT

## 2023-01-20 PROCEDURE — U0005: CPT

## 2023-01-22 ENCOUNTER — TRANSCRIPTION ENCOUNTER (OUTPATIENT)
Age: 63
End: 2023-01-22

## 2023-01-23 ENCOUNTER — TRANSCRIPTION ENCOUNTER (OUTPATIENT)
Age: 63
End: 2023-01-23

## 2023-01-23 ENCOUNTER — OUTPATIENT (OUTPATIENT)
Dept: OUTPATIENT SERVICES | Facility: HOSPITAL | Age: 63
LOS: 1 days | End: 2023-01-23
Payer: COMMERCIAL

## 2023-01-23 VITALS
HEART RATE: 64 BPM | TEMPERATURE: 97 F | OXYGEN SATURATION: 98 % | SYSTOLIC BLOOD PRESSURE: 120 MMHG | WEIGHT: 229.94 LBS | HEIGHT: 71 IN | RESPIRATION RATE: 16 BRPM | DIASTOLIC BLOOD PRESSURE: 79 MMHG

## 2023-01-23 VITALS
DIASTOLIC BLOOD PRESSURE: 62 MMHG | OXYGEN SATURATION: 99 % | RESPIRATION RATE: 16 BRPM | TEMPERATURE: 97 F | HEART RATE: 52 BPM | SYSTOLIC BLOOD PRESSURE: 132 MMHG

## 2023-01-23 DIAGNOSIS — Z98.890 OTHER SPECIFIED POSTPROCEDURAL STATES: Chronic | ICD-10-CM

## 2023-01-23 DIAGNOSIS — N95.0 POSTMENOPAUSAL BLEEDING: ICD-10-CM

## 2023-01-23 PROCEDURE — 88341 IMHCHEM/IMCYTCHM EA ADD ANTB: CPT

## 2023-01-23 PROCEDURE — 58558 HYSTEROSCOPY BIOPSY: CPT

## 2023-01-23 PROCEDURE — 88341 IMHCHEM/IMCYTCHM EA ADD ANTB: CPT | Mod: 26

## 2023-01-23 PROCEDURE — 88342 IMHCHEM/IMCYTCHM 1ST ANTB: CPT

## 2023-01-23 PROCEDURE — 88342 IMHCHEM/IMCYTCHM 1ST ANTB: CPT | Mod: 26

## 2023-01-23 PROCEDURE — 88305 TISSUE EXAM BY PATHOLOGIST: CPT | Mod: 26

## 2023-01-23 PROCEDURE — 88305 TISSUE EXAM BY PATHOLOGIST: CPT

## 2023-01-23 DEVICE — MYOSURE TISSUE REMOVAL DEVICE REACH: Type: IMPLANTABLE DEVICE | Status: FUNCTIONAL

## 2023-01-23 DEVICE — MYOSURE TISSUE REMOVAL DEVICE LITE: Type: IMPLANTABLE DEVICE | Status: FUNCTIONAL

## 2023-01-23 DEVICE — MYOSURE TISSUE REMOVAL FMS FOR FLUENT XL: Type: IMPLANTABLE DEVICE | Status: FUNCTIONAL

## 2023-01-23 RX ORDER — LIDOCAINE HCL 20 MG/ML
0.2 VIAL (ML) INJECTION ONCE
Refills: 0 | Status: COMPLETED | OUTPATIENT
Start: 2023-01-23 | End: 2023-01-23

## 2023-01-23 RX ADMIN — SODIUM CHLORIDE 100 MILLILITER(S): 9 INJECTION, SOLUTION INTRAVENOUS at 09:35

## 2023-01-23 NOTE — BRIEF OPERATIVE NOTE - OPERATION/FINDINGS
Normal sized anteverted uterus, moderate cystocele, anterior wall fluffy endometrial tissue, right ostia with polypoid appearing growth

## 2023-01-23 NOTE — ASU PATIENT PROFILE, ADULT - TEACHING/LEARNING DEVELOPMENTAL CONSIDERATIONS
Faxed to Dr. Hooker and Dr. Cleaning for review.  
Patient is being referred to orthopedics with referral DX of Other osteoporosis without current pathological fracture   Bilateral hip pain, please review and advise on scheduling.  
none

## 2023-01-23 NOTE — ASU DISCHARGE PLAN (ADULT/PEDIATRIC) - CARE PROVIDER_API CALL
Eleni Gordon)  Obstetrics and Gynecology  [address], [address]  [place] [address]  Phone: [phone]  Fax: [phone]  Follow Up Time: 2 weeks

## 2023-01-23 NOTE — ASU DISCHARGE PLAN (ADULT/PEDIATRIC) - NURSING INSTRUCTIONS
OK to take Tylenol/Acetaminophen at _5Pm_____ for pain and every 6 hours after as needed. OK to take Motrin/Ibuprofen at _5PM____ for pain and every 6 hours after as needed. Take pain emdicines alternate.

## 2023-01-23 NOTE — ASU PREOP CHECKLIST - AS BP NONINV METHOD
electronic Bexarotene Pregnancy And Lactation Text: This medication is Pregnancy Category X and should not be given to women who are pregnant or may become pregnant. This medication should not be used if you are breast feeding.

## 2023-01-23 NOTE — PRE-ANESTHESIA EVALUATION ADULT - NSANTHPMHFT_GEN_ALL_CORE
62yr old female  with hx of postmenopausal bleeding. Hx sig for occas irreg heart beat on Diltiazem. Work-up neg    No complications from prior anesthetics

## 2023-01-23 NOTE — PACU DISCHARGE NOTE - MENTAL STATUS: PATIENT PARTICIPATION
PT DAILY TREATMENT NOTE    Patient Name: Manpreet Lambert  Date:2020  : 1970  [x]  Patient  Verified  Payor: Sal Green / Plan: Seun Brown / Product Type: Managed Care Medicaid /    In time: 5:38 PM  Out time: 6:24 PM  Total Treatment Time (min): 46  Total Timed Codes (min): 46  One-on-One Treatment Time (min): 46  Visit #: 5 of     Treatment Area: Acute pain of left knee [M25.562]  Acute low back pain [M54.5]    SUBJECTIVE  Pain Level (0-10 scale): 5/10 Location: low back, bilat knees  Any medication changes, allergies to medications, adverse drug reactions, diagnosis change, or new procedure performed?: [x] No [] Yes (see summary sheet for update)  Subjective functional status/changes: See Progress Note    OBJECTIVE    10 min Therapeutic Exercise: [x] See Flow Sheet   Rationale: increase ROM, increase strength, improve coordination, improve balance, and increase proprioception to improve the patients ability to progress to functional activities limited by pain or other dysfunction     9 min Therapeutic Activity: [x] See Flow Sheet   Rationale: to improve functional activities, model real life movements and performance specific to the patients need with supervision to return the patient to their PLOF      9 min Neuromuscular Re-education: [x] See Flow Sheet   Rationale: exercises designed to improve and/or maintain balance, coordination, kinesthetic sense, posture, and/or proprioception for functional activities to improve the patients ability to function in daily life    8 min Gait Training: [x] See Flow Sheet   Rationale: facilitate gait in all varieties including dynamic movements to improve gait quality in the home and community        10    Billed As:   [] TE   [] TA   [] Neuro   [x] Self Care min Patient Education: [x] Review HEP, pt progress, POC       Other Objective/Functional Measures:    Pain Level (0-10 scale) post treatment: 5/10 Location: low back, bilat knees    ASSESSMENT:    Patient will continue to benefit from skilled PT services to modify and progress therapeutic interventions, address functional mobility deficits, address ROM deficits, address strength deficits, analyze and address soft tissue restrictions, analyze and cue movement patterns, analyze and modify body mechanics/ergonomics, assess and modify postural abnormalities, address imbalance/dizziness, and instruct in home and community integration to attain remaining goals.      []  See Plan of Care  [x]  See Progress Note/Re-certification  []  See Discharge Summary      Progress towards goals / Updated goals: See Progress Note    PLAN  [x] Upgrade activities as tolerated   [x] Continue plan of care  [] Update interventions per flow sheet  [] Discharge due to:   [] Other:     Ira Laurent, PT 9/22/2020 7:21 PM    Future Appointments   Date Time Provider Florentin Edmonds   9/29/2020  2:45 PM [facility] PT [facility]   10/6/2020  2:45 PM [facility] PT [facility] [facility]   10/13/2020  3:30 PM [facility] PT [facility]   10/20/2020  4:15 PM [facility] PT [place] Awake

## 2023-01-23 NOTE — ASU PREOP CHECKLIST - AS TEMP SITE
Arnie Clarke phoned in to let us know that her Dr. Shine Ruse her on amox/clav 875mg bid, and oxyfloxin ear drops. She is also wondering is she can start using something called Super Beets.   Powder  form that you mix with water forehead

## 2023-01-23 NOTE — ASU DISCHARGE PLAN (ADULT/PEDIATRIC) - NS MD DC FALL RISK RISK
For information on Fall & Injury Prevention, visit: [email or web address] OR  [email or web address] OR  https://www.cdc.gov/steadi/patient.html

## 2023-01-23 NOTE — BRIEF OPERATIVE NOTE - NSICDXBRIEFPROCEDURE_GEN_ALL_CORE_FT
PROCEDURES:  Hysteroscopy, with dilation and curettage of uterus and polypectomy or uterine myomectomy using MyoCardioDxre tissue removal system 23-Jan-2023 11:17:53  Eleni Gordon E

## 2023-01-23 NOTE — ASU DISCHARGE PLAN (ADULT/PEDIATRIC) - ASU DC SPECIAL INSTRUCTIONSFT
Postoperative Instructions      Pain control     For pain control, take the followin. Motrin 600mg four times a day (every 6 hours), take with food  2. Add Tylenol 975mg four times a day (every 6 hours), alternated with motrin  Motrin and Tylenol can be obtained over the counter.    Postoperative restrictions   Do not drive or make important decisions for 24 hours after anesthesia. You may feel drowsy for 24 hours and should have a responsible adult with you during that time. Nothing in the vagina (tampons, sexual intercourse), No tub baths, pools or hot tubs for 2 weeks (showers are ok!). No lifting anything heavier than 15 lbs, no strenuous exercise for 1 week after surgery.       Vaginal bleeding   Spotting per vagina is normal in first few days after surgery. If you are soaking 1 pad per hour, that is not normal and you should notify your doctor's office and seek medical attention right away.        Signs of Infection  Some postoperative pain and discomfort is normal. However, if you experience any of the following, you may be developing an infection and should be seen by your doctor: pain that does not get better with the oral medications listed above, fever greater than 100.4F, foul smelling discharge coming from the surgical site, nausea and vomiting that does not stop (especially if you are unable to tolerate oral intake), or inability to urinate. If you experience any of these symptoms, call your doctor's office to be seen right away.    Follow Up  Call your doctor's office to schedule a postoperative appointment in 2 weeks. The results from the procedure will be discussed with you at that time.

## 2023-01-30 PROBLEM — E66.9 OBESITY, UNSPECIFIED: Chronic | Status: ACTIVE | Noted: 2023-01-17

## 2023-01-30 LAB — SURGICAL PATHOLOGY STUDY: SIGNIFICANT CHANGE UP

## 2023-01-31 PROBLEM — Z76.89 ESTABLISHING CARE WITH NEW DOCTOR, ENCOUNTER FOR: Status: ACTIVE | Noted: 2023-01-31

## 2023-02-01 ENCOUNTER — APPOINTMENT (OUTPATIENT)
Dept: GYNECOLOGIC ONCOLOGY | Facility: CLINIC | Age: 63
End: 2023-02-01
Payer: COMMERCIAL

## 2023-02-01 VITALS
SYSTOLIC BLOOD PRESSURE: 120 MMHG | BODY MASS INDEX: 32.2 KG/M2 | DIASTOLIC BLOOD PRESSURE: 81 MMHG | HEART RATE: 75 BPM | WEIGHT: 230 LBS | HEIGHT: 71 IN

## 2023-02-01 DIAGNOSIS — Z76.89 PERSONS ENCOUNTERING HEALTH SERVICES IN OTHER SPECIFIED CIRCUMSTANCES: ICD-10-CM

## 2023-02-01 PROCEDURE — 99204 OFFICE O/P NEW MOD 45 MIN: CPT

## 2023-02-01 RX ORDER — DICLOFENAC SODIUM 75 MG/1
75 TABLET, DELAYED RELEASE ORAL
Qty: 60 | Refills: 3 | Status: DISCONTINUED | COMMUNITY
Start: 2022-12-13 | End: 2023-02-01

## 2023-02-01 RX ORDER — ERGOCALCIFEROL 1.25 MG/1
1.25 MG CAPSULE ORAL
Qty: 13 | Refills: 3 | Status: DISCONTINUED | COMMUNITY
Start: 2022-05-05 | End: 2023-02-01

## 2023-02-01 RX ORDER — LEVOTHYROXINE SODIUM 0.05 MG/1
50 TABLET ORAL DAILY
Qty: 90 | Refills: 3 | Status: DISCONTINUED | COMMUNITY
Start: 2022-05-05 | End: 2023-02-01

## 2023-02-03 ENCOUNTER — OUTPATIENT (OUTPATIENT)
Dept: OUTPATIENT SERVICES | Facility: HOSPITAL | Age: 63
LOS: 1 days | End: 2023-02-03
Payer: COMMERCIAL

## 2023-02-03 VITALS
WEIGHT: 229.94 LBS | RESPIRATION RATE: 16 BRPM | SYSTOLIC BLOOD PRESSURE: 129 MMHG | DIASTOLIC BLOOD PRESSURE: 80 MMHG | TEMPERATURE: 99 F | OXYGEN SATURATION: 98 % | HEIGHT: 70 IN | HEART RATE: 68 BPM

## 2023-02-03 DIAGNOSIS — Z98.890 OTHER SPECIFIED POSTPROCEDURAL STATES: Chronic | ICD-10-CM

## 2023-02-03 DIAGNOSIS — R00.2 PALPITATIONS: ICD-10-CM

## 2023-02-03 DIAGNOSIS — C54.1 MALIGNANT NEOPLASM OF ENDOMETRIUM: ICD-10-CM

## 2023-02-03 LAB
A1C WITH ESTIMATED AVERAGE GLUCOSE RESULT: 5 % — SIGNIFICANT CHANGE UP (ref 4–5.6)
ALBUMIN SERPL ELPH-MCNC: 4 G/DL — SIGNIFICANT CHANGE UP (ref 3.3–5)
ALP SERPL-CCNC: 70 U/L — SIGNIFICANT CHANGE UP (ref 40–120)
ALT FLD-CCNC: 11 U/L — SIGNIFICANT CHANGE UP (ref 4–33)
ANION GAP SERPL CALC-SCNC: 7 MMOL/L — SIGNIFICANT CHANGE UP (ref 7–14)
AST SERPL-CCNC: 11 U/L — SIGNIFICANT CHANGE UP (ref 4–32)
BILIRUB SERPL-MCNC: 0.4 MG/DL — SIGNIFICANT CHANGE UP (ref 0.2–1.2)
BLD GP AB SCN SERPL QL: NEGATIVE — SIGNIFICANT CHANGE UP
BUN SERPL-MCNC: 12 MG/DL — SIGNIFICANT CHANGE UP (ref 7–23)
CALCIUM SERPL-MCNC: 9 MG/DL — SIGNIFICANT CHANGE UP (ref 8.4–10.5)
CHLORIDE SERPL-SCNC: 106 MMOL/L — SIGNIFICANT CHANGE UP (ref 98–107)
CO2 SERPL-SCNC: 25 MMOL/L — SIGNIFICANT CHANGE UP (ref 22–31)
CREAT SERPL-MCNC: 0.81 MG/DL — SIGNIFICANT CHANGE UP (ref 0.5–1.3)
EGFR: 82 ML/MIN/1.73M2 — SIGNIFICANT CHANGE UP
ESTIMATED AVERAGE GLUCOSE: 97 — SIGNIFICANT CHANGE UP
GLUCOSE SERPL-MCNC: 98 MG/DL — SIGNIFICANT CHANGE UP (ref 70–99)
HCT VFR BLD CALC: 40.9 % — SIGNIFICANT CHANGE UP (ref 34.5–45)
HGB BLD-MCNC: 13.1 G/DL — SIGNIFICANT CHANGE UP (ref 11.5–15.5)
MCHC RBC-ENTMCNC: 28.3 PG — SIGNIFICANT CHANGE UP (ref 27–34)
MCHC RBC-ENTMCNC: 32 GM/DL — SIGNIFICANT CHANGE UP (ref 32–36)
MCV RBC AUTO: 88.3 FL — SIGNIFICANT CHANGE UP (ref 80–100)
NRBC # BLD: 0 /100 WBCS — SIGNIFICANT CHANGE UP (ref 0–0)
NRBC # FLD: 0 K/UL — SIGNIFICANT CHANGE UP (ref 0–0)
PLATELET # BLD AUTO: 292 K/UL — SIGNIFICANT CHANGE UP (ref 150–400)
POTASSIUM SERPL-MCNC: 4.1 MMOL/L — SIGNIFICANT CHANGE UP (ref 3.5–5.3)
POTASSIUM SERPL-SCNC: 4.1 MMOL/L — SIGNIFICANT CHANGE UP (ref 3.5–5.3)
PROT SERPL-MCNC: 6.7 G/DL — SIGNIFICANT CHANGE UP (ref 6–8.3)
RBC # BLD: 4.63 M/UL — SIGNIFICANT CHANGE UP (ref 3.8–5.2)
RBC # FLD: 14 % — SIGNIFICANT CHANGE UP (ref 10.3–14.5)
RH IG SCN BLD-IMP: POSITIVE — SIGNIFICANT CHANGE UP
SODIUM SERPL-SCNC: 138 MMOL/L — SIGNIFICANT CHANGE UP (ref 135–145)
WBC # BLD: 6.14 K/UL — SIGNIFICANT CHANGE UP (ref 3.8–10.5)
WBC # FLD AUTO: 6.14 K/UL — SIGNIFICANT CHANGE UP (ref 3.8–10.5)

## 2023-02-03 PROCEDURE — 93010 ELECTROCARDIOGRAM REPORT: CPT

## 2023-02-03 RX ORDER — ACETAMINOPHEN 500 MG
2 TABLET ORAL
Qty: 0 | Refills: 0 | DISCHARGE
End: 2023-01-31

## 2023-02-03 RX ORDER — ALPRAZOLAM 0.25 MG
0 TABLET ORAL
Qty: 0 | Refills: 0 | DISCHARGE

## 2023-02-03 RX ORDER — IBUPROFEN 200 MG
1 TABLET ORAL
Qty: 0 | Refills: 0 | DISCHARGE
End: 2023-01-31

## 2023-02-03 RX ORDER — SODIUM CHLORIDE 9 MG/ML
1000 INJECTION, SOLUTION INTRAVENOUS
Refills: 0 | Status: DISCONTINUED | OUTPATIENT
Start: 2023-02-16 | End: 2023-03-02

## 2023-02-03 NOTE — H&P PST ADULT - PROBLEM SELECTOR PLAN 1
Patient tentatively scheduled for robotic assisted total laparoscopic hysterectomy , bilateral salpingo oophorectomy, sentinel lymph node mapping staging     Pre-op instructions provided. Pt given verbal and written instructions with teach back on chlorhexidine shampoo and Pepcid. Pt verbalized understanding with return demonstration.    Pt has a scheduled preop COVID test.

## 2023-02-03 NOTE — H&P PST ADULT - NSICDXPASTMEDICALHX_GEN_ALL_CORE_FT
PAST MEDICAL HISTORY:  Hypothyroid     Irregular heart beat     Obesity (BMI 30.0-34.9)     Palpitations      PAST MEDICAL HISTORY:  Endometrial cancer     Hypothyroid     Irregular heart beat     Obesity (BMI 30.0-34.9)     Palpitations

## 2023-02-03 NOTE — H&P PST ADULT - HISTORY OF PRESENT ILLNESS
62yr old female  with hx of postmenopausal bleeding. Coming for D&C Dx hysteroscopy possible polypectomy possible myosure. Hx sig for occas irreg heart beat on Diltiazem. Work-up neg last EKG and echo on chart. BMI 32.1. Hx of covid 2020  and 2022. second time mild symptoms.    Note; covid test 2023 [facility] 62yr old female  with complaint of postmenopausal bleeding.  s/p D&C Dx hysteroscopy with biopsy confirmed endometrial Cancer. patient is scheduled for robotic assisted total laparoscopic hysterectomy , bilateral salpingo oophorectomy sentinel lymph node mapping , staging

## 2023-02-07 ENCOUNTER — RESULT REVIEW (OUTPATIENT)
Age: 63
End: 2023-02-07

## 2023-02-07 PROBLEM — C54.1 MALIGNANT NEOPLASM OF ENDOMETRIUM: Chronic | Status: ACTIVE | Noted: 2023-02-03

## 2023-02-07 PROBLEM — R00.2 PALPITATIONS: Chronic | Status: ACTIVE | Noted: 2023-02-03

## 2023-02-09 ENCOUNTER — OUTPATIENT (OUTPATIENT)
Dept: OUTPATIENT SERVICES | Facility: HOSPITAL | Age: 63
LOS: 1 days | End: 2023-02-09
Payer: COMMERCIAL

## 2023-02-09 ENCOUNTER — APPOINTMENT (OUTPATIENT)
Dept: CT IMAGING | Facility: IMAGING CENTER | Age: 63
End: 2023-02-09
Payer: COMMERCIAL

## 2023-02-09 DIAGNOSIS — Z98.890 OTHER SPECIFIED POSTPROCEDURAL STATES: Chronic | ICD-10-CM

## 2023-02-09 DIAGNOSIS — C54.1 MALIGNANT NEOPLASM OF ENDOMETRIUM: ICD-10-CM

## 2023-02-09 PROCEDURE — 71250 CT THORAX DX C-: CPT

## 2023-02-09 PROCEDURE — 74176 CT ABD & PELVIS W/O CONTRAST: CPT

## 2023-02-09 PROCEDURE — 71250 CT THORAX DX C-: CPT | Mod: 26

## 2023-02-09 PROCEDURE — 74176 CT ABD & PELVIS W/O CONTRAST: CPT | Mod: 26

## 2023-02-15 ENCOUNTER — TRANSCRIPTION ENCOUNTER (OUTPATIENT)
Age: 63
End: 2023-02-15

## 2023-02-15 NOTE — ASU PATIENT PROFILE, ADULT - MUTUALITY COMMENT, PROFILE
n/a Discussed with Pt & her  their ability to have questions answered by dr garibay & anesthesia before going into the OR

## 2023-02-15 NOTE — ASU PATIENT PROFILE, ADULT - NSICDXPASTMEDICALHX_GEN_ALL_CORE_FT
PAST MEDICAL HISTORY:  Endometrial cancer     Hypothyroid     Irregular heart beat     Obesity (BMI 30.0-34.9)     Palpitations

## 2023-02-15 NOTE — ASU PATIENT PROFILE, ADULT - FALL HARM RISK - UNIVERSAL INTERVENTIONS
Bed in lowest position, wheels locked, appropriate side rails in place/Call bell, personal items and telephone in reach/Instruct patient to call for assistance before getting out of bed or chair/Non-slip footwear when patient is out of bed/[place] to call system/Physically safe environment - no spills, clutter or unnecessary equipment/Purposeful Proactive Rounding/Room/bathroom lighting operational, light cord in reach

## 2023-02-16 ENCOUNTER — APPOINTMENT (OUTPATIENT)
Dept: GYNECOLOGIC ONCOLOGY | Facility: HOSPITAL | Age: 63
End: 2023-02-16

## 2023-02-16 ENCOUNTER — OUTPATIENT (OUTPATIENT)
Dept: OUTPATIENT SERVICES | Facility: HOSPITAL | Age: 63
LOS: 1 days | Discharge: HOME | End: 2023-02-16
Payer: COMMERCIAL

## 2023-02-16 ENCOUNTER — RESULT REVIEW (OUTPATIENT)
Age: 63
End: 2023-02-16

## 2023-02-16 ENCOUNTER — TRANSCRIPTION ENCOUNTER (OUTPATIENT)
Age: 63
End: 2023-02-16

## 2023-02-16 VITALS
RESPIRATION RATE: 14 BRPM | HEART RATE: 63 BPM | TEMPERATURE: 98 F | SYSTOLIC BLOOD PRESSURE: 124 MMHG | OXYGEN SATURATION: 98 % | DIASTOLIC BLOOD PRESSURE: 77 MMHG | WEIGHT: 229.94 LBS | HEIGHT: 70 IN

## 2023-02-16 VITALS
RESPIRATION RATE: 18 BRPM | SYSTOLIC BLOOD PRESSURE: 108 MMHG | OXYGEN SATURATION: 98 % | HEART RATE: 49 BPM | DIASTOLIC BLOOD PRESSURE: 54 MMHG

## 2023-02-16 DIAGNOSIS — C54.1 MALIGNANT NEOPLASM OF ENDOMETRIUM: ICD-10-CM

## 2023-02-16 DIAGNOSIS — Z98.890 OTHER SPECIFIED POSTPROCEDURAL STATES: Chronic | ICD-10-CM

## 2023-02-16 LAB — GLUCOSE BLDC GLUCOMTR-MCNC: 109 MG/DL — HIGH (ref 70–99)

## 2023-02-16 PROCEDURE — 88309 TISSUE EXAM BY PATHOLOGIST: CPT | Mod: 26

## 2023-02-16 PROCEDURE — 88342 IMHCHEM/IMCYTCHM 1ST ANTB: CPT | Mod: 26

## 2023-02-16 PROCEDURE — 58571 TLH W/T/O 250 G OR LESS: CPT

## 2023-02-16 PROCEDURE — 88112 CYTOPATH CELL ENHANCE TECH: CPT | Mod: 26

## 2023-02-16 PROCEDURE — 88307 TISSUE EXAM BY PATHOLOGIST: CPT | Mod: 26

## 2023-02-16 PROCEDURE — 38570 LAPAROSCOPY LYMPH NODE BIOP: CPT

## 2023-02-16 PROCEDURE — S2900 ROBOTIC SURGICAL SYSTEM: CPT | Mod: NC

## 2023-02-16 PROCEDURE — 38900 IO MAP OF SENT LYMPH NODE: CPT

## 2023-02-16 PROCEDURE — 88341 IMHCHEM/IMCYTCHM EA ADD ANTB: CPT | Mod: 26

## 2023-02-16 DEVICE — ARISTA 3GR: Type: IMPLANTABLE DEVICE | Status: FUNCTIONAL

## 2023-02-16 RX ORDER — CHOLECALCIFEROL (VITAMIN D3) 125 MCG
1 CAPSULE ORAL
Qty: 0 | Refills: 0 | DISCHARGE

## 2023-02-16 RX ORDER — DILTIAZEM HCL 120 MG
0 CAPSULE, EXT RELEASE 24 HR ORAL
Qty: 0 | Refills: 0 | DISCHARGE

## 2023-02-16 RX ORDER — CHLORHEXIDINE GLUCONATE 213 G/1000ML
1 SOLUTION TOPICAL ONCE
Refills: 0 | Status: COMPLETED | OUTPATIENT
Start: 2023-02-16 | End: 2023-02-16

## 2023-02-16 RX ORDER — ONDANSETRON 8 MG/1
4 TABLET, FILM COATED ORAL ONCE
Refills: 0 | Status: COMPLETED | OUTPATIENT
Start: 2023-02-16 | End: 2023-02-16

## 2023-02-16 RX ORDER — HYDROMORPHONE HYDROCHLORIDE 2 MG/ML
0.5 INJECTION INTRAMUSCULAR; INTRAVENOUS; SUBCUTANEOUS
Refills: 0 | Status: DISCONTINUED | OUTPATIENT
Start: 2023-02-16 | End: 2023-02-16

## 2023-02-16 RX ORDER — DIPHENHYDRAMINE HCL 50 MG
1 CAPSULE ORAL
Qty: 0 | Refills: 0 | DISCHARGE

## 2023-02-16 RX ORDER — BRIMONIDINE TARTRATE 2 MG/MG
1 SOLUTION/ DROPS OPHTHALMIC
Qty: 0 | Refills: 0 | DISCHARGE

## 2023-02-16 RX ORDER — OXYCODONE HYDROCHLORIDE 5 MG/1
1 TABLET ORAL
Qty: 5 | Refills: 0
Start: 2023-02-16 | End: 2023-02-17

## 2023-02-16 RX ADMIN — CHLORHEXIDINE GLUCONATE 1 APPLICATION(S): 213 SOLUTION TOPICAL at 06:00

## 2023-02-16 RX ADMIN — ONDANSETRON 4 MILLIGRAM(S): 8 TABLET, FILM COATED ORAL at 11:18

## 2023-02-16 NOTE — CHART NOTE - NSCHARTNOTEFT_GEN_A_CORE
GYNECOLOGIC ONCOLOGY PA POST OP NOTE    Patient seen and examined at bedside sitting up drinking water. Pain well controlled. Patient denies headache, dizziness, nausea, vomiting, chest pain and sob. Hopkins removed in OR. patient ambulated to bathroom, voided 150cc.       OBJECTIVE:     VITALS:  T(F): 97.2 (02-16-23 @ 11:46), Max: 98.1 (02-16-23 @ 06:07)  HR: 49 (02-16-23 @ 12:34) (46 - 63)  BP: 108/54 (02-16-23 @ 12:34) (98/57 - 124/77)  RR: 18 (02-16-23 @ 12:34) (10 - 18)  SpO2: 98% (02-16-23 @ 12:34) (93% - 100%)  Wt(kg): --    I&O's Summary    16 Feb 2023 07:01  -  16 Feb 2023 12:41  --------------------------------------------------------  IN: 340 mL / OUT: 150 mL / NET: 190 mL        MEDICATIONS  (STANDING):  lactated ringers. 1000 milliLiter(s) (30 mL/Hr) IV Continuous <Continuous>    MEDICATIONS  (PRN):  HYDROmorphone  Injectable 0.5 milliGRAM(s) IV Push every 10 minutes PRN Moderate Pain (4 - 6)      Physical Exam:  Constitutional: NAD  Pulmonary: clear to auscultation bilaterally   Cardiovascular: Regular rate and rhythm   Abdomen: soft, non-distended, appropriate tenderness, scope sites C/D/I  Extremities: no lower extremity edema or calve tenderness      Assessment/Plan:  61yo female s/p Robot-assisted laparoscopic total hysterectomy with bilateral salpingo-oophorectomy (BSO) and sentinel lymph node biopsy in stable condition  -discharge home  -discharge instructions reviewed with patient and ; all questions answered  -F/U with Dr. garibay in 2 weeks  -d/w gyn/onc team    Alan GILESC   #[id number]

## 2023-02-16 NOTE — BRIEF OPERATIVE NOTE - OPERATION/FINDINGS
EUA small, mobile uterus, adnexa non-enlarged bilaterally   Pelvic survey grossly normal uterus, bilateral fallopian tubes and ovaries. No ascites, cysts, or adhesions noted

## 2023-02-16 NOTE — BRIEF OPERATIVE NOTE - SPECIMENS
Uterus, cervix, bilateral fallopian tubes and ovaries, right and left pelvic sentinel lymph nodes, pelvic washings

## 2023-02-16 NOTE — ASU DISCHARGE PLAN (ADULT/PEDIATRIC) - NS MD DC FALL RISK RISK
For information on Fall & Injury Prevention, visit: [email or web address] OR  [email or web address] OR  https://www.cdc.gov/steadi/patient.html

## 2023-02-16 NOTE — ASU DISCHARGE PLAN (ADULT/PEDIATRIC) - PROCEDURE
robot assisted total laparoscopic hysterectomy, bilateral salpingo-oophorectomy robot assisted total laparoscopic hysterectomy, bilateral salpingo-oophorectomy, sentinel lymph node dissection

## 2023-02-16 NOTE — ASU DISCHARGE PLAN (ADULT/PEDIATRIC) - CARE PROVIDER_API CALL
Tonia Arce)  Gynecologic Oncology; Obstetrics and Gynecology  [address] [place] [address]  Phone: [phone]  Fax: [phone]  Follow Up Time:

## 2023-02-16 NOTE — ASU DISCHARGE PLAN (ADULT/PEDIATRIC) - PAIN MANAGEMENT
You can take up to 600mg Ibuprofen every 6 hours and/or tylenol 975mg every 6 hours/Prescriptions electronically submitted to pharmacy from [facility]

## 2023-02-16 NOTE — ASU DISCHARGE PLAN (ADULT/PEDIATRIC) - NURSING INSTRUCTIONS
You received IV Tylenol for pain management at _9__. Please DO NOT take any Tylenol (Acetaminophen) containing products, such as Vicodin, Percocet, Excedrin, and cold medications for the next 6 hours (until 3___ PM). DO NOT TAKE MORE THAN 3000 MG OF TYLENOL in a 24 hour period.   You received IV Toradol for pain management at 10__. Please DO NOT take Motrin/Ibuprofen/Advil/Aleve/NSAIDs (Non-Steroidal Anti-Inflammatory Drugs) for the next 6 hours (until __4_ PM).

## 2023-02-16 NOTE — ASU DISCHARGE PLAN (ADULT/PEDIATRIC) - FOLLOW UP APPOINTMENTS
[facility] may also call Recovery Room (PACU) 24/7 @ [phone]/[facility]

## 2023-02-16 NOTE — BRIEF OPERATIVE NOTE - NSICDXBRIEFPROCEDURE_GEN_ALL_CORE_FT
PROCEDURES:  Robot-assisted laparoscopic total hysterectomy with bilateral salpingo-oophorectomy (BSO) and sentinel lymph node biopsy using da Delvis Si 16-Feb-2023 10:06:30  Shoshana Coker

## 2023-02-16 NOTE — ASU DISCHARGE PLAN (ADULT/PEDIATRIC) - ASU DC SPECIAL INSTRUCTIONSFT
Radiology History & Physical      SUBJECTIVE:     Chief Complaint: cirrhosis    History of Present Illness:  Daniel Miller is a 67 y.o. male who presents for xjug liver biopsy.    Past Medical History:   Diagnosis Date    Actinic keratosis 9/2/2016    Acute ITP 2/6/2008    Arthritis     Cancer of skin of right upper extremity 9/2/2016    Degenerative disc disease     Diabetes mellitus     Gout     Hypertension     Kidney stone     Malignant neoplasm of skin of right upper extremity 10/24/2016    Malignant neoplasm of skin of right upper extremity 10/24/2016    Nephrolithiasis     Rheumatoid arthritis     RLS (restless legs syndrome)     Stroke 2003; 2005 x 2     2 or 3 times (affected his speech and weak all over); ear doctor said vertigo in 5-2015 may have had a mild stroke    Thrombocytopenia     Thyroid disease     Vertigo 5/2015    Ear doctor said it may have been a mild stroke     Past Surgical History:   Procedure Laterality Date    APPENDECTOMY  1990    CAROTID ENDARTERECTOMY Right 2004    CHOLECYSTECTOMY  2011    EXTRACORPOREAL SHOCK WAVE LITHOTRIPSY  2000    EYE SURGERY Bilateral 2012    Cataract surgery with lens implant    HERNIA REPAIR  2011    Umbilical hernia    ROTATOR CUFF REPAIR Right 2008       Home Meds:   Prior to Admission medications    Medication Sig Start Date End Date Taking? Authorizing Provider   allopurinol (ZYLOPRIM) 300 MG tablet Take 300 mg by mouth once daily.  8/6/14  Yes Historical Provider, MD   buPROPion (WELLBUTRIN XL) 300 MG 24 hr tablet TAKE 1 TABLET(S) EVERY DAY BY ORAL ROUTE. 11/27/17  Yes Historical Provider, MD   escitalopram oxalate (LEXAPRO) 20 MG tablet  1/15/18  Yes Historical Provider, MD   folic acid (FOLVITE) 1 MG tablet Take 1,000 mcg by mouth once daily. 7/17/14  Yes Historical Provider, MD   furosemide (LASIX) 20 MG tablet Take 20 mg by mouth 2 (two) times daily. 6/27/14  Yes Historical Provider, MD   gabapentin (NEURONTIN) 300 MG  capsule Take 300 mg by mouth 3 (three) times daily.   Yes Historical Provider, MD   glimepiride (AMARYL) 2 MG tablet Take 2 mg by mouth before breakfast.   Yes Historical Provider, MD   magnesium oxide (MAG-OX) 400 mg tablet Take 1 tablet by mouth 2 (two) times daily. 2/1/18  Yes Historical Provider, MD   meclizine (ANTIVERT) 25 mg tablet Take 25 mg by mouth every evening.  7/17/14  Yes Historical Provider, MD   metoprolol tartrate (LOPRESSOR) 25 MG tablet Take 25 mg by mouth 2 (two) times daily.   Yes Historical Provider, MD   naltrexone (DEPADE) 50 mg tablet  2/2/18  Yes Historical Provider, MD   pantoprazole (PROTONIX) 40 MG tablet Take 40 mg by mouth once daily.   Yes Historical Provider, MD   pramipexole (MIRAPEX) 0.125 MG tablet Take 0.125 mg by mouth nightly as needed. 7/27/14  Yes Historical Provider, MD   rosuvastatin (CRESTOR) 20 MG tablet Take 20 mg by mouth once daily.   Yes Historical Provider, MD   tamsulosin (FLOMAX) 0.4 mg Cp24 Take 1 capsule (0.4 mg total) by mouth once daily. 12/23/15  Yes Teto Clay MD   aspirin (ECOTRIN) 81 MG EC tablet Take 81 mg by mouth once daily.    Historical Provider, MD   ELIQUIS 2.5 mg Tab  2/1/18   Historical Provider, MD   ergocalciferol (VITAMIN D2) 50,000 unit Cap Take 1,000 Units by mouth every 7 days.    Historical Provider, MD   fish oil-omega-3 fatty acids 300-1,000 mg capsule Take 2 g by mouth 2 (two) times daily.     Historical Provider, MD   omega-3 acid ethyl esters (LOVAZA) 1 gram capsule Take 2 g by mouth 2 (two) times daily.    Historical Provider, MD   SIMETHICONE ORAL Take 160 mg by mouth once daily.    Historical Provider, MD   tramadol (ULTRAM) 50 mg tablet TAKE 1 TABLET BY MOUTH EVERY 6 HOURS AS NEEDED FOR PAIN 2/20/15   Isaak Sainz MD     Anticoagulants/Antiplatelets: no anticoagulation    Allergies:   Review of patient's allergies indicates:   Allergen Reactions    Lipitor [atorvastatin] Hives     Sedation History:  no adverse  reactions    Review of Systems:   As documented in primary provider H&P.      OBJECTIVE:     Vital Signs (Most Recent)  Temp: 98.6 °F (37 °C) (05/29/18 1253)  Pulse: 76 (05/29/18 1253)  Resp: 18 (05/29/18 1253)  BP: (!) 150/74 (05/29/18 1253)  SpO2: 96 % (05/29/18 1253)    Physical Exam:  ASA: 3  Mallampati: 1      Laboratory  Lab Results   Component Value Date    INR 1.0 05/21/2018       Lab Results   Component Value Date    WBC 5.98 05/21/2018    HGB 13.8 (L) 05/21/2018    HCT 42.4 05/21/2018    MCV 92 05/21/2018    PLT 92 (L) 05/21/2018      Lab Results   Component Value Date     (H) 05/21/2018     05/21/2018    K 4.0 05/21/2018     05/21/2018    CO2 30 (H) 05/21/2018    BUN 16 05/21/2018    CREATININE 1.1 05/21/2018    CALCIUM 9.7 05/21/2018    ALT 21 05/21/2018    AST 17 05/21/2018    ALBUMIN 3.7 05/21/2018    BILITOT 0.9 05/21/2018    BILIDIR 0.2 05/08/2018       ASSESSMENT/PLAN:     Sedation Plan: moderate  Patient will undergo transjugular liver biopsy.    Gus Houston MD  Department of Radiology  Pager: [phone]         Regular diet as tolerated, regular activity as tolerated, no heavy lifting for first two weeks.  Nothing per vagina: no intercourse, tampons or douching.  Call your provider if you experience fevers, chills, worsening abdominal pain, inability to urinate or worsening vaginal bleeding.    Follow up with Dr. Arce on 3/1/23 at 4:30pm

## 2023-02-17 LAB — NON-GYNECOLOGICAL CYTOLOGY STUDY: SIGNIFICANT CHANGE UP

## 2023-02-21 ENCOUNTER — NON-APPOINTMENT (OUTPATIENT)
Age: 63
End: 2023-02-21

## 2023-02-23 LAB — SURGICAL PATHOLOGY STUDY: SIGNIFICANT CHANGE UP

## 2023-03-01 ENCOUNTER — APPOINTMENT (OUTPATIENT)
Dept: GYNECOLOGIC ONCOLOGY | Facility: CLINIC | Age: 63
End: 2023-03-01
Payer: COMMERCIAL

## 2023-03-01 VITALS
HEART RATE: 70 BPM | HEIGHT: 71 IN | SYSTOLIC BLOOD PRESSURE: 131 MMHG | DIASTOLIC BLOOD PRESSURE: 60 MMHG | TEMPERATURE: 98 F | WEIGHT: 230 LBS | BODY MASS INDEX: 32.2 KG/M2

## 2023-03-01 DIAGNOSIS — N85.00 ENDOMETRIAL HYPERPLASIA, UNSPECIFIED: ICD-10-CM

## 2023-03-01 DIAGNOSIS — Z87.01 PERSONAL HISTORY OF PNEUMONIA (RECURRENT): ICD-10-CM

## 2023-03-01 PROCEDURE — 99024 POSTOP FOLLOW-UP VISIT: CPT

## 2023-04-20 NOTE — ASU DISCHARGE PLAN (ADULT/PEDIATRIC) - CONDITION AT DISCHARGE
----- Message from Valentina Sahni MD sent at 4/16/2023 10:07 PM CDT -----  Give results.  [place] speaking.  ____________    Liver, kidneys, and electrolytes are normal.    Sugars are getting higher.  You have to be careful with what you eat--avoid bread, rice, cereal, pasta, and tortillas.  Exercise will keep your sugars down.  You are not diabetic but you are getting closer.    Valentina Sahni MD   Stable

## 2023-04-21 ENCOUNTER — RX RENEWAL (OUTPATIENT)
Age: 63
End: 2023-04-21

## 2023-04-22 ENCOUNTER — APPOINTMENT (OUTPATIENT)
Dept: INTERNAL MEDICINE | Facility: CLINIC | Age: 63
End: 2023-04-22
Payer: COMMERCIAL

## 2023-04-22 VITALS
RESPIRATION RATE: 14 BRPM | SYSTOLIC BLOOD PRESSURE: 124 MMHG | OXYGEN SATURATION: 98 % | TEMPERATURE: 98.1 F | HEART RATE: 66 BPM | DIASTOLIC BLOOD PRESSURE: 76 MMHG

## 2023-04-22 VITALS — HEIGHT: 71 IN | BODY MASS INDEX: 32.2 KG/M2 | WEIGHT: 230 LBS

## 2023-04-22 DIAGNOSIS — I83.90 ASYMPTOMATIC VARICOSE VEINS OF UNSPECIFIED LOWER EXTREMITY: ICD-10-CM

## 2023-04-22 PROCEDURE — G0447 BEHAVIOR COUNSEL OBESITY 15M: CPT | Mod: 59

## 2023-04-22 PROCEDURE — 99396 PREV VISIT EST AGE 40-64: CPT | Mod: 25

## 2023-04-22 NOTE — HEALTH RISK ASSESSMENT
[Very Good] : ~his/her~  mood as very good [Yes] : Yes [Monthly or less (1 pt)] : Monthly or less (1 point) [1 or 2 (0 pts)] : 1 or 2 (0 points) [Never (0 pts)] : Never (0 points) [No] : In the past 12 months have you used drugs other than those required for medical reasons? No [No falls in past year] : Patient reported no falls in the past year [0] : 2) Feeling down, depressed, or hopeless: Not at all (0) [PHQ-2 Negative - No further assessment needed] : PHQ-2 Negative - No further assessment needed [Patient reported mammogram was normal] : Patient reported mammogram was normal [Patient reported bone density results were abnormal] : Patient reported bone density results were abnormal [Patient reported colonoscopy was abnormal] : Patient reported colonoscopy was abnormal [HIV test declined] : HIV test declined [Hepatitis C test declined] : Hepatitis C test declined [None] : None [Never] : Never [de-identified] : Gyn (Dr. Gordon), Gyn-Onc (Dr. Arce), Cards (Dr. Mayer),  GI (Dr. Andrew) [Audit-CScore] : 1 [de-identified] : walks a few times a week [de-identified] : generally healthy/balanced [[id number]] : 0 [Change in mental status noted] : No change in mental status noted [Language] : denies difficulty with language [Behavior] : denies difficulty with behavior [Learning/Retaining New Information] : denies difficulty learning/retaining new information [Handling Complex Tasks] : denies difficulty handling complex tasks [Reasoning] : denies difficulty with reasoning [Spatial Ability and Orientation] : denies difficulty with spatial ability and orientation [MammogramDate] : 09/21 [MammogramComments] : has Rx from Gyn [BoneDensityComments] : osteopenia [BoneDensityDate] : 09/21 [ColonoscopyDate] : 03/21 [ColonoscopyComments] : 4 hyperplastic polyps--recommend repeat 03/23--due to schedule

## 2023-04-22 NOTE — PHYSICAL EXAM
[No Carotid Bruits] : no carotid bruits [No Abdominal Bruit] : a ~M bruit was not heard ~T in the abdomen [Pedal Pulses Present] : the pedal pulses are present [No Edema] : there was no peripheral edema [No Palpable Aorta] : no palpable aorta [No Extremity Clubbing/Cyanosis] : no extremity clubbing/cyanosis [Declined Breast Exam] : declined breast exam  [Normal] : affect was normal and insight and judgment were intact [de-identified] : scattered spider veins bilaterally, no pitting edema [de-identified] : had done with gyn recently [de-identified] : no bladder fullness or tenderness, Healing well in area of recent lap surgery [de-identified] : scattered freckles/moles--none with apparent concerning

## 2023-04-22 NOTE — COUNSELING
[Benefits of weight loss discussed] : Benefits of weight loss discussed [Encouraged to increase physical activity] : Encouraged to increase physical activity [Weigh Self Weekly] : weigh self weekly [____ min/wk Activity] : [unfilled] min/wk activity [Good understanding] : Patient has a good understanding of disease, goals and obesity follow-up plan [FreeTextEntry4] : 15

## 2023-04-22 NOTE — DATA REVIEWED
[FreeTextEntry1] : in-office EKG patient deferred\par EKG in chart from 2/3/2023 NSR no ST-T wave abnormalities

## 2023-04-22 NOTE — PLAN
[FreeTextEntry1] : #BMI >30: counseled on diet, exercise, check TFTs. Consider further management after reviewing above\par #Endometrial CA: s/p VAUGHN, following with gyn, gyn-onc\par #h/o Atrial tachy.palpitations: controlled, on Diltiazem ER daily, f/u cardiologist as needed. EKG as above/normal.\par #Vit D deficiency: will f/u lab\par #Spider veins: discussed leg elevation, compression stockings, weight loss. \par \par Labs ordered--will do fasting\par \par Flu vaccine UTD\par COVID19: discussed booster\par Tdap UTD\par Shingrix: discussed/defers for now\par Mammogram: has Rx\par Colonoscopy: due--to see GI\par Bone Density: UTD 9/21, osteopenia--Calcium/D3 discussed\par

## 2023-04-22 NOTE — HISTORY OF PRESENT ILLNESS
[FreeTextEntry1] : new patient, c/o fatigue [de-identified] : MARV MEREDITH is a 62 year old female with history as reviewed below, presents for new patient comprehensive exam.\par Main complaint is of fatigue--has h/o hypothyroidism, was initiated on Levothyroxine last year, but after a few months she admits she stopped med. Also notes h/o Vitamin D deficiency, was initiated on weekly high dose supplement, but again admits after a few months stopped it. Overall notes the last year was very eventful/busy (2 sons got , daughter had twin boys, her diagnosis of Endometrial CA in early 2023) and as a result admits her own self-care was put off, but now wishes to re-focus and get back on track. \par She desires to lose weight, admits work is needed in diet, exercise and is hopeful to start making changes in the next few months.

## 2023-05-09 ENCOUNTER — LABORATORY RESULT (OUTPATIENT)
Age: 63
End: 2023-05-09

## 2023-05-12 LAB
25(OH)D3 SERPL-MCNC: 27.1 NG/ML
ALBUMIN SERPL ELPH-MCNC: 4.2 G/DL
ALP BLD-CCNC: 84 U/L
ALT SERPL-CCNC: 11 U/L
ANION GAP SERPL CALC-SCNC: 17 MMOL/L
APPEARANCE: CLEAR
AST SERPL-CCNC: 17 U/L
BASOPHILS # BLD AUTO: 0.1 K/UL
BASOPHILS NFR BLD AUTO: 1.8 %
BILIRUB SERPL-MCNC: 0.3 MG/DL
BILIRUBIN URINE: NEGATIVE
BLOOD URINE: NEGATIVE
BUN SERPL-MCNC: 16 MG/DL
CALCIUM SERPL-MCNC: 9.4 MG/DL
CHLORIDE SERPL-SCNC: 107 MMOL/L
CHOLEST SERPL-MCNC: 213 MG/DL
CO2 SERPL-SCNC: 19 MMOL/L
COLOR: YELLOW
CREAT SERPL-MCNC: 0.93 MG/DL
EGFR: 69 ML/MIN/1.73M2
EOSINOPHIL # BLD AUTO: 0.29 K/UL
EOSINOPHIL NFR BLD AUTO: 5.2 %
ESTIMATED AVERAGE GLUCOSE: 105 MG/DL
GLUCOSE QUALITATIVE U: NEGATIVE MG/DL
GLUCOSE SERPL-MCNC: 107 MG/DL
HBA1C MFR BLD HPLC: 5.3 %
HCT VFR BLD CALC: 44.2 %
HDLC SERPL-MCNC: 57 MG/DL
HGB BLD-MCNC: 14 G/DL
IMM GRANULOCYTES NFR BLD AUTO: 0.4 %
KETONES URINE: NEGATIVE MG/DL
LDLC SERPL CALC-MCNC: 135 MG/DL
LEUKOCYTE ESTERASE URINE: ABNORMAL
LYMPHOCYTES # BLD AUTO: 1.64 K/UL
LYMPHOCYTES NFR BLD AUTO: 29.2 %
MAN DIFF?: NORMAL
MCHC RBC-ENTMCNC: 28.7 PG
MCHC RBC-ENTMCNC: 31.7 GM/DL
MCV RBC AUTO: 90.8 FL
MONOCYTES # BLD AUTO: 0.51 K/UL
MONOCYTES NFR BLD AUTO: 9.1 %
NEUTROPHILS # BLD AUTO: 3.05 K/UL
NEUTROPHILS NFR BLD AUTO: 54.3 %
NITRITE URINE: NEGATIVE
NONHDLC SERPL-MCNC: 156 MG/DL
PH URINE: 5.5
PLATELET # BLD AUTO: 296 K/UL
POTASSIUM SERPL-SCNC: 5 MMOL/L
PROT SERPL-MCNC: 6.9 G/DL
PROTEIN URINE: NEGATIVE MG/DL
RBC # BLD: 4.87 M/UL
RBC # FLD: 13 %
SODIUM SERPL-SCNC: 143 MMOL/L
SPECIFIC GRAVITY URINE: 1.02
T3FREE SERPL-MCNC: 2.97 PG/ML
T4 FREE SERPL-MCNC: 1.1 NG/DL
THYROGLOB AB SERPL-ACNC: <20 IU/ML
THYROPEROXIDASE AB SERPL IA-ACNC: <10 IU/ML
TRIGL SERPL-MCNC: 108 MG/DL
TSH SERPL-ACNC: 5.1 UIU/ML
UROBILINOGEN URINE: 0.2 MG/DL
WBC # FLD AUTO: 5.61 K/UL

## 2023-06-28 ENCOUNTER — APPOINTMENT (OUTPATIENT)
Dept: GYNECOLOGIC ONCOLOGY | Facility: CLINIC | Age: 63
End: 2023-06-28
Payer: COMMERCIAL

## 2023-06-28 VITALS
BODY MASS INDEX: 32.65 KG/M2 | WEIGHT: 233.2 LBS | HEIGHT: 71 IN | HEART RATE: 69 BPM | SYSTOLIC BLOOD PRESSURE: 132 MMHG | DIASTOLIC BLOOD PRESSURE: 83 MMHG

## 2023-06-28 PROCEDURE — 57100 BIOPSY VAGINAL MUCOSA SIMPLE: CPT

## 2023-06-28 PROCEDURE — 99213 OFFICE O/P EST LOW 20 MIN: CPT | Mod: 25

## 2023-06-28 RX ORDER — METFORMIN HYDROCHLORIDE 500 MG/1
500 TABLET, COATED ORAL
Qty: 90 | Refills: 1 | Status: DISCONTINUED | COMMUNITY
Start: 2023-05-12 | End: 2023-06-28

## 2023-07-10 LAB — CORE LAB BIOPSY: NORMAL

## 2023-07-31 ENCOUNTER — APPOINTMENT (OUTPATIENT)
Dept: ENDOCRINOLOGY | Facility: CLINIC | Age: 63
End: 2023-07-31
Payer: COMMERCIAL

## 2023-07-31 VITALS
HEART RATE: 64 BPM | DIASTOLIC BLOOD PRESSURE: 64 MMHG | OXYGEN SATURATION: 98 % | BODY MASS INDEX: 33.18 KG/M2 | SYSTOLIC BLOOD PRESSURE: 110 MMHG | HEIGHT: 71 IN | WEIGHT: 237 LBS

## 2023-07-31 PROCEDURE — 99214 OFFICE O/P EST MOD 30 MIN: CPT

## 2023-07-31 RX ORDER — LEVOTHYROXINE SODIUM 0.05 MG/1
50 TABLET ORAL DAILY
Qty: 90 | Refills: 3 | Status: ACTIVE | COMMUNITY
Start: 2023-05-12 | End: 1900-01-01

## 2023-07-31 NOTE — ASSESSMENT
[FreeTextEntry1] : 62 year old woman with subclinical hypothyroidism,   - Increase levothyroxine to 50 micrograms daily, repeat tfts in 2 months   Vitamin D def'y - -continue vitamin D   Thyroid nodules: Stable on ultrasound, nondiagnostic FNA in the past x 2 (2010 and 2012). Repeat ultrasound in ~ 6 months to monitor for change  Obesity - will prescribe Wegovy, start with 0.25 mg weekly.  COunseled on side effects.  No history of pancreatitis.,  No family history of medulary thyroid cancer  f/u  6 months

## 2023-07-31 NOTE — REVIEW OF SYSTEMS
Call placed to remind Pt of upcoming appt with Smoking Cessation. Appt was canceled and rescheduled for 8-14-23. Pt verbalized understanding.      [Negative] : Heme/Lymph

## 2023-07-31 NOTE — PHYSICAL EXAM
[Alert] : alert [Healthy Appearance] : healthy appearance [Obese] : obese [No Acute Distress] : no acute distress [Normal Sclera/Conjunctiva] : normal sclera/conjunctiva [No Proptosis] : no proptosis [No LAD] : no lymphadenopathy [Thyroid Not Enlarged] : the thyroid was not enlarged [No Respiratory Distress] : no respiratory distress [Clear to Auscultation] : lungs were clear to auscultation bilaterally [Normal S1, S2] : normal S1 and S2 [Regular Rhythm] : with a regular rhythm [No Edema] : no peripheral edema [Normal Bowel Sounds] : normal bowel sounds [Not Tender] : non-tender [Soft] : abdomen soft [No Spinal Tenderness] : no spinal tenderness [No Involuntary Movements] : no involuntary movements were seen [Normal Strength/Tone] : muscle strength and tone were normal [Normal Reflexes] : deep tendon reflexes were 2+ and symmetric [No Tremors] : no tremors [Normal Affect] : the affect was normal [Normal Mood] : the mood was normal [de-identified] : left nodule ~ 2 cm

## 2023-07-31 NOTE — HISTORY OF PRESENT ILLNESS
[FreeTextEntry1] : cc: hypothyrroidism  62 year old woman with hypothyroidism, on levothyroxine.  She had stopped the levothyroxine.  Saw PMD and TSH was elevated, was started on 25 micrograms.  Feeling sluggish and gaining weight  Also with vitamin D def'y - taking supplemental vitamin D  She also has a thyroid nodule, no change in her neck, no dyspnea or dysphagia,  Diagnosed with endometrial cancer earlier this year, now post hysterectomy.  Has been gaining weight.  Has tried multiple diets without success.  Would like to try medication

## 2023-09-19 NOTE — ASU PREOP CHECKLIST - VIA
ambulate Posterior Auricular Interpolation Flap Text: A decision was made to reconstruct the defect utilizing an interpolation axial flap and a staged reconstruction.  A telfa template was made of the defect.  This telfa template was then used to outline the posterior auricular interpolation flap.  The donor area for the pedicle flap was then injected with anesthesia.  The flap was excised through the skin and subcutaneous tissue down to the layer of the underlying musculature.  The pedicle flap was carefully excised within this deep plane to maintain its blood supply.  The edges of the donor site were undermined.   The donor site was closed in a primary fashion.  The pedicle was then rotated into position and sutured.  Once the tube was sutured into place, adequate blood supply was confirmed with blanching and refill.  The pedicle was then wrapped with xeroform gauze and dressed appropriately with a telfa and gauze bandage to ensure continued blood supply and protect the attached pedicle.

## 2023-09-21 ENCOUNTER — APPOINTMENT (OUTPATIENT)
Dept: ENDOCRINOLOGY | Facility: CLINIC | Age: 63
End: 2023-09-21
Payer: COMMERCIAL

## 2023-09-21 VITALS
WEIGHT: 229 LBS | HEART RATE: 64 BPM | OXYGEN SATURATION: 98 % | HEIGHT: 71 IN | BODY MASS INDEX: 32.06 KG/M2 | SYSTOLIC BLOOD PRESSURE: 130 MMHG | DIASTOLIC BLOOD PRESSURE: 82 MMHG

## 2023-09-21 PROCEDURE — 99214 OFFICE O/P EST MOD 30 MIN: CPT | Mod: 25

## 2023-09-22 LAB
T4 FREE SERPL-MCNC: 1.5 NG/DL
TSH SERPL-ACNC: 2.43 UIU/ML

## 2023-10-27 ENCOUNTER — NON-APPOINTMENT (OUTPATIENT)
Age: 63
End: 2023-10-27

## 2023-11-01 ENCOUNTER — APPOINTMENT (OUTPATIENT)
Dept: GYNECOLOGIC ONCOLOGY | Facility: CLINIC | Age: 63
End: 2023-11-01
Payer: SELF-PAY

## 2023-11-01 VITALS
BODY MASS INDEX: 31.5 KG/M2 | SYSTOLIC BLOOD PRESSURE: 147 MMHG | HEIGHT: 71 IN | DIASTOLIC BLOOD PRESSURE: 85 MMHG | HEART RATE: 72 BPM | WEIGHT: 225 LBS

## 2023-11-01 PROCEDURE — 99213 OFFICE O/P EST LOW 20 MIN: CPT

## 2023-11-07 ENCOUNTER — RESULT REVIEW (OUTPATIENT)
Age: 63
End: 2023-11-07

## 2023-11-07 ENCOUNTER — APPOINTMENT (OUTPATIENT)
Dept: MAMMOGRAPHY | Facility: CLINIC | Age: 63
End: 2023-11-07
Payer: COMMERCIAL

## 2023-11-07 PROCEDURE — 77063 BREAST TOMOSYNTHESIS BI: CPT

## 2023-11-07 PROCEDURE — 77067 SCR MAMMO BI INCL CAD: CPT

## 2023-12-12 ENCOUNTER — RX RENEWAL (OUTPATIENT)
Age: 63
End: 2023-12-12

## 2023-12-31 ENCOUNTER — NON-APPOINTMENT (OUTPATIENT)
Age: 63
End: 2023-12-31

## 2024-02-08 ENCOUNTER — APPOINTMENT (OUTPATIENT)
Dept: ENDOCRINOLOGY | Facility: CLINIC | Age: 64
End: 2024-02-08
Payer: COMMERCIAL

## 2024-02-08 VITALS
OXYGEN SATURATION: 99 % | HEART RATE: 70 BPM | SYSTOLIC BLOOD PRESSURE: 106 MMHG | DIASTOLIC BLOOD PRESSURE: 60 MMHG | HEIGHT: 71 IN | BODY MASS INDEX: 29.68 KG/M2 | WEIGHT: 212 LBS

## 2024-02-08 DIAGNOSIS — E03.8 OTHER SPECIFIED HYPOTHYROIDISM: ICD-10-CM

## 2024-02-08 DIAGNOSIS — E04.2 NONTOXIC MULTINODULAR GOITER: ICD-10-CM

## 2024-02-08 PROCEDURE — G2211 COMPLEX E/M VISIT ADD ON: CPT

## 2024-02-08 PROCEDURE — 99214 OFFICE O/P EST MOD 30 MIN: CPT

## 2024-02-08 NOTE — HISTORY OF PRESENT ILLNESS
[FreeTextEntry1] : cc: hypothyrroidism and obesity  63 year old woman with hypothyroidism, on levothyroxine 50 micrograms daily.  Has some fatigue, but does not sleep well.  No other symptoms.  No recent changes  Vtamin D def'y - taking supplemental vitamin D  She also has a thyroid nodule,  reports no change in her neck, no dyspnea or dysphagia,  Obesity, taking Wegovy, had decreased dose, then was feeling better and just increase dose to 1.7 mg weekly  Has lost further weight about 25 pounds total on home scale.  Nausea has resolved

## 2024-02-08 NOTE — ASSESSMENT
[FreeTextEntry1] : 63 year old woman with subclinical hypothyroidism, on levothyroxine  - check tfts and adjust levothyroxine dose as needed  Vitamin D def'y - -continue vitamin D  Thyroid nodules: Stable on ultrasound, nondiagnostic FNA in the past x 2 (2010 and 2012). Repeat ultrasound to monitor for change  Obesity - continue Wegovy, 1.7 mg weekly  f/u   6 months

## 2024-02-08 NOTE — PHYSICAL EXAM
[Alert] : alert [Healthy Appearance] : healthy appearance [No Acute Distress] : no acute distress [Normal Sclera/Conjunctiva] : normal sclera/conjunctiva [No Proptosis] : no proptosis [No LAD] : no lymphadenopathy [Thyroid Not Enlarged] : the thyroid was not enlarged [No Respiratory Distress] : no respiratory distress [Clear to Auscultation] : lungs were clear to auscultation bilaterally [Normal S1, S2] : normal S1 and S2 [Regular Rhythm] : with a regular rhythm [No Edema] : no peripheral edema [Normal Bowel Sounds] : normal bowel sounds [Not Tender] : non-tender [Soft] : abdomen soft [No Spinal Tenderness] : no spinal tenderness [No Involuntary Movements] : no involuntary movements were seen [Normal Strength/Tone] : muscle strength and tone were normal [Normal Reflexes] : deep tendon reflexes were 2+ and symmetric [No Tremors] : no tremors [Normal Affect] : the affect was normal [Normal Mood] : the mood was normal [Kyphosis] : no kyphosis present [de-identified] : left nodule ~ 2 cm

## 2024-02-09 LAB
ALBUMIN SERPL ELPH-MCNC: 4.5 G/DL
ALP BLD-CCNC: 87 U/L
ALT SERPL-CCNC: 8 U/L
ANION GAP SERPL CALC-SCNC: 9 MMOL/L
AST SERPL-CCNC: 14 U/L
BILIRUB SERPL-MCNC: 0.2 MG/DL
BUN SERPL-MCNC: 16 MG/DL
CALCIUM SERPL-MCNC: 9.6 MG/DL
CHLORIDE SERPL-SCNC: 103 MMOL/L
CO2 SERPL-SCNC: 26 MMOL/L
CREAT SERPL-MCNC: 0.95 MG/DL
EGFR: 67 ML/MIN/1.73M2
GLUCOSE SERPL-MCNC: 93 MG/DL
POTASSIUM SERPL-SCNC: 4.5 MMOL/L
PROT SERPL-MCNC: 6.8 G/DL
SODIUM SERPL-SCNC: 138 MMOL/L
T4 FREE SERPL-MCNC: 1.4 NG/DL
TSH SERPL-ACNC: 2.02 UIU/ML

## 2024-02-14 ENCOUNTER — RX CHANGE (OUTPATIENT)
Age: 64
End: 2024-02-14

## 2024-02-28 PROBLEM — C54.1 ENDOMETRIAL CANCER: Status: ACTIVE | Noted: 2023-01-31

## 2024-02-29 ENCOUNTER — TRANSCRIPTION ENCOUNTER (OUTPATIENT)
Age: 64
End: 2024-02-29

## 2024-03-06 ENCOUNTER — APPOINTMENT (OUTPATIENT)
Dept: GYNECOLOGIC ONCOLOGY | Facility: CLINIC | Age: 64
End: 2024-03-06
Payer: COMMERCIAL

## 2024-03-06 VITALS
BODY MASS INDEX: 28.87 KG/M2 | DIASTOLIC BLOOD PRESSURE: 80 MMHG | HEART RATE: 73 BPM | SYSTOLIC BLOOD PRESSURE: 130 MMHG | WEIGHT: 207 LBS

## 2024-03-06 DIAGNOSIS — N89.8 OTHER SPECIFIED NONINFLAMMATORY DISORDERS OF VAGINA: ICD-10-CM

## 2024-03-06 DIAGNOSIS — E66.9 OBESITY, UNSPECIFIED: ICD-10-CM

## 2024-03-06 DIAGNOSIS — C54.1 MALIGNANT NEOPLASM OF ENDOMETRIUM: ICD-10-CM

## 2024-03-06 PROCEDURE — 99213 OFFICE O/P EST LOW 20 MIN: CPT

## 2024-03-06 RX ORDER — SEMAGLUTIDE 1 MG/.5ML
1 INJECTION, SOLUTION SUBCUTANEOUS
Qty: 1 | Refills: 1 | Status: DISCONTINUED | COMMUNITY
Start: 2023-07-31 | End: 2024-03-06

## 2024-03-06 NOTE — PHYSICAL EXAM
[Chaperone Present] : A chaperone was present in the examining room during all aspects of the physical examination [Absent] : Adnexa(ae): Absent [Normal] : Recto-Vaginal Exam: Normal [Fully active, able to carry on all pre-disease performance without restriction] : Status 0 - Fully active, able to carry on all pre-disease performance without restriction [FreeTextEntry1] : Hoa [de-identified] : Robotic incisions

## 2024-03-06 NOTE — HISTORY OF PRESENT ILLNESS
[FreeTextEntry1] : Stage IAG1 pMMR endometrial cancer R-A TLH, BSO, SLND, 2/16/23 GYN/Ref: Eleni Gordon MD   Ms. Strauss presents for surveillance. She feels well. Denies VB, discharge, bowel or urinary issues.  She has lost 30 lbs on Wegovy.   Vaginal apex biopsy on 6/28/23 - benign  HCM: Mammo - 11/2023 - BIRADS 1 BD - 9/2021 - osteopenia Colonoscopy - 3/2021

## 2024-04-30 ENCOUNTER — NON-APPOINTMENT (OUTPATIENT)
Age: 64
End: 2024-04-30

## 2024-04-30 ENCOUNTER — LABORATORY RESULT (OUTPATIENT)
Age: 64
End: 2024-04-30

## 2024-04-30 ENCOUNTER — APPOINTMENT (OUTPATIENT)
Dept: INTERNAL MEDICINE | Facility: CLINIC | Age: 64
End: 2024-04-30
Payer: COMMERCIAL

## 2024-04-30 VITALS
DIASTOLIC BLOOD PRESSURE: 76 MMHG | RESPIRATION RATE: 14 BRPM | HEART RATE: 72 BPM | SYSTOLIC BLOOD PRESSURE: 122 MMHG | OXYGEN SATURATION: 98 %

## 2024-04-30 VITALS — BODY MASS INDEX: 28.14 KG/M2 | WEIGHT: 201 LBS | HEIGHT: 71 IN

## 2024-04-30 DIAGNOSIS — I47.19 OTHER SUPRAVENTRICULAR TACHYCARDIA: ICD-10-CM

## 2024-04-30 DIAGNOSIS — E03.9 HYPOTHYROIDISM, UNSPECIFIED: ICD-10-CM

## 2024-04-30 DIAGNOSIS — Z87.898 PERSONAL HISTORY OF OTHER SPECIFIED CONDITIONS: ICD-10-CM

## 2024-04-30 DIAGNOSIS — Z00.00 ENCOUNTER FOR GENERAL ADULT MEDICAL EXAMINATION W/OUT ABNORMAL FINDINGS: ICD-10-CM

## 2024-04-30 DIAGNOSIS — F51.04 PSYCHOPHYSIOLOGIC INSOMNIA: ICD-10-CM

## 2024-04-30 DIAGNOSIS — E55.9 VITAMIN D DEFICIENCY, UNSPECIFIED: ICD-10-CM

## 2024-04-30 PROCEDURE — 99213 OFFICE O/P EST LOW 20 MIN: CPT | Mod: 25

## 2024-04-30 PROCEDURE — 99396 PREV VISIT EST AGE 40-64: CPT

## 2024-04-30 PROCEDURE — 36415 COLL VENOUS BLD VENIPUNCTURE: CPT

## 2024-04-30 PROCEDURE — 93000 ELECTROCARDIOGRAM COMPLETE: CPT

## 2024-04-30 RX ORDER — TEMAZEPAM 15 MG/1
15 CAPSULE ORAL
Qty: 30 | Refills: 0 | Status: ACTIVE | COMMUNITY
Start: 2024-04-30 | End: 1900-01-01

## 2024-04-30 NOTE — PHYSICAL EXAM
[No Carotid Bruits] : no carotid bruits [No Abdominal Bruit] : a ~M bruit was not heard ~T in the abdomen [Pedal Pulses Present] : the pedal pulses are present [No Edema] : there was no peripheral edema [No Palpable Aorta] : no palpable aorta [No Extremity Clubbing/Cyanosis] : no extremity clubbing/cyanosis [Declined Breast Exam] : declined breast exam  [Normal] : affect was normal and insight and judgment were intact [de-identified] : scattered spider veins bilaterally, no pitting edema [de-identified] : had done with gyn recently [de-identified] : no bladder fullness or tenderness [de-identified] : scattered freckles/moles--none with apparent concerning

## 2024-04-30 NOTE — PLAN
[FreeTextEntry1] : #BMI 28: congratulated patient on her weight loss, encouraged her to continue dietary and lifestyole changes. Follow-up with endocrinologist--on Wegovy 1.7 mg weekly. #Hypothyroidism: clinically euthyroid on Levothyroxine 50 mcg daily, check TSH with reflex T4 #Endometrial CA: s/p VAUGHN, following with gyn, gyn-onc #h/o Atrial tachy/palpitations: controlled, continue Diltiazem  mg daily, f/u cardiologist as needed. EKG as above/normal. #Vit D deficiency: will f/u lab drawn for Vit D 25 Oh #Spider veins: continue leg elevation, compression stockings, weight loss.  #Insomnia: discussed, Xanax is not FDA indicated for insomnia, will switch to Temazepam 15 mg daily as needed--may start with 1/2 tab at night.   Labs drawn in office today Flu vaccine UTD COVID19: discussed booster Tdap UTD Shingrix: defers for now Mammogram: has Rx Colonoscopy: due--to see GI Bone Density: UTD 9/21, osteopenia--Calcium/D3 discussed, repeat in 9/2024  F/u 6m/prn

## 2024-04-30 NOTE — HISTORY OF PRESENT ILLNESS
[FreeTextEntry1] : annual physical, c/o chronic sleep issues [de-identified] : MARV MEREDITH is a 63 year old female with history as reviewed below, presents for annual comprehensive exam. She feels great today. Prior symptoms of fatigue much improved. Presently on Levothyroxine 50 mcg and Wegovy 1.7 mg weekly--has lost ~ 40 lbs over the past 7-8 months. Has been eating better, finds food cravings and even desire to drink any alcohol has greatly reduced.  Exercising, keeping active. +skin tags on back, neck, sometimes irritate her/cause discomfort. Has chronic insomnia--was Rx Xanax to take a night for this, has Rx for 0.5 mg, takes 1/2 tab as needed. Has never been on other Rx meds.

## 2024-04-30 NOTE — HEALTH RISK ASSESSMENT
[Very Good] : ~his/her~  mood as very good [Yes] : Yes [Monthly or less (1 pt)] : Monthly or less (1 point) [1 or 2 (0 pts)] : 1 or 2 (0 points) [Never (0 pts)] : Never (0 points) [No] : In the past 12 months have you used drugs other than those required for medical reasons? No [No falls in past year] : Patient reported no falls in the past year [0] : 2) Feeling down, depressed, or hopeless: Not at all (0) [PHQ-2 Negative - No further assessment needed] : PHQ-2 Negative - No further assessment needed [Patient reported mammogram was normal] : Patient reported mammogram was normal [Patient reported bone density results were abnormal] : Patient reported bone density results were abnormal [Patient reported colonoscopy was abnormal] : Patient reported colonoscopy was abnormal [HIV test declined] : HIV test declined [Hepatitis C test declined] : Hepatitis C test declined [None] : None [Employed] : employed [] :  [# Of Children ___] : has [unfilled] children [Feels Safe at Home] : Feels safe at home [Fully functional (bathing, dressing, toileting, transferring, walking, feeding)] : Fully functional (bathing, dressing, toileting, transferring, walking, feeding) [Fully functional (using the telephone, shopping, preparing meals, housekeeping, doing laundry, using] : Fully functional and needs no help or supervision to perform IADLs (using the telephone, shopping, preparing meals, housekeeping, doing laundry, using transportation, managing medications and managing finances) [Smoke Detector] : smoke detector [Carbon Monoxide Detector] : carbon monoxide detector [Safety elements used in home] : safety elements used in home [Seat Belt] :  uses seat belt [Sunscreen] : uses sunscreen [Never] : Never [de-identified] : Gyn-Onc (Dr. Arce), Cards (Dr. Mayer),  GI (Dr. Andrew) [Audit-CScore] : 1 [de-identified] : walks a few times a week [de-identified] : generally healthy/balanced [TMA1Ytiif] : 0 [Change in mental status noted] : No change in mental status noted [Language] : denies difficulty with language [Behavior] : denies difficulty with behavior [Learning/Retaining New Information] : denies difficulty learning/retaining new information [Handling Complex Tasks] : denies difficulty handling complex tasks [Reasoning] : denies difficulty with reasoning [Spatial Ability and Orientation] : denies difficulty with spatial ability and orientation [Reports changes in hearing] : Reports no changes in hearing [Reports changes in vision] : Reports no changes in vision [Reports changes in dental health] : Reports no changes in dental health [TB Exposure] : is not being exposed to tuberculosis [MammogramDate] : 11/23 [PapSmearDate] : n/a [BoneDensityDate] : 09/21 [BoneDensityComments] : osteopenia, due in 9/2024 [ColonoscopyDate] : 03/21 [ColonoscopyComments] : 4 hyperplastic polyps--recommend repeat 03/23--due to schedule [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

## 2024-05-01 LAB
25(OH)D3 SERPL-MCNC: 24.3 NG/ML
ALBUMIN SERPL ELPH-MCNC: 4.4 G/DL
ALP BLD-CCNC: 76 U/L
ALT SERPL-CCNC: 10 U/L
ANION GAP SERPL CALC-SCNC: 12 MMOL/L
APPEARANCE: ABNORMAL
AST SERPL-CCNC: 17 U/L
BASOPHILS # BLD AUTO: 0.11 K/UL
BASOPHILS NFR BLD AUTO: 1.6 %
BILIRUB SERPL-MCNC: 0.4 MG/DL
BILIRUBIN URINE: NEGATIVE
BLOOD URINE: NEGATIVE
BUN SERPL-MCNC: 13 MG/DL
CALCIUM SERPL-MCNC: 9.5 MG/DL
CHLORIDE SERPL-SCNC: 104 MMOL/L
CHOLEST SERPL-MCNC: 189 MG/DL
CO2 SERPL-SCNC: 23 MMOL/L
COLOR: YELLOW
CREAT SERPL-MCNC: 0.81 MG/DL
EGFR: 81 ML/MIN/1.73M2
EOSINOPHIL # BLD AUTO: 0.18 K/UL
EOSINOPHIL NFR BLD AUTO: 2.6 %
ESTIMATED AVERAGE GLUCOSE: 103 MG/DL
GLUCOSE QUALITATIVE U: NEGATIVE MG/DL
GLUCOSE SERPL-MCNC: 89 MG/DL
HBA1C MFR BLD HPLC: 5.2 %
HCT VFR BLD CALC: 43.8 %
HDLC SERPL-MCNC: 54 MG/DL
HGB BLD-MCNC: 14.1 G/DL
IMM GRANULOCYTES NFR BLD AUTO: 0.1 %
KETONES URINE: NEGATIVE MG/DL
LDLC SERPL CALC-MCNC: 113 MG/DL
LEUKOCYTE ESTERASE URINE: NEGATIVE
LYMPHOCYTES # BLD AUTO: 2.18 K/UL
LYMPHOCYTES NFR BLD AUTO: 31.3 %
MAN DIFF?: NORMAL
MCHC RBC-ENTMCNC: 28.7 PG
MCHC RBC-ENTMCNC: 32.2 GM/DL
MCV RBC AUTO: 89.2 FL
MONOCYTES # BLD AUTO: 0.58 K/UL
MONOCYTES NFR BLD AUTO: 8.3 %
NEUTROPHILS # BLD AUTO: 3.9 K/UL
NEUTROPHILS NFR BLD AUTO: 56.1 %
NITRITE URINE: NEGATIVE
NONHDLC SERPL-MCNC: 135 MG/DL
PH URINE: 6
PLATELET # BLD AUTO: 344 K/UL
POTASSIUM SERPL-SCNC: 4.5 MMOL/L
PROT SERPL-MCNC: 7.1 G/DL
PROTEIN URINE: NEGATIVE MG/DL
RBC # BLD: 4.91 M/UL
RBC # FLD: 13.5 %
SODIUM SERPL-SCNC: 139 MMOL/L
SPECIFIC GRAVITY URINE: 1.01
TRIGL SERPL-MCNC: 124 MG/DL
TSH SERPL-ACNC: 2.68 UIU/ML
UROBILINOGEN URINE: 0.2 MG/DL
WBC # FLD AUTO: 6.96 K/UL

## 2024-05-06 RX ORDER — SEMAGLUTIDE 1.7 MG/.75ML
1.7 INJECTION, SOLUTION SUBCUTANEOUS
Qty: 3 | Refills: 3 | Status: ACTIVE | COMMUNITY
Start: 2023-12-27 | End: 1900-01-01

## 2024-05-28 ENCOUNTER — RX RENEWAL (OUTPATIENT)
Age: 64
End: 2024-05-28

## 2024-05-28 RX ORDER — DILTIAZEM HYDROCHLORIDE 120 MG/1
120 CAPSULE, EXTENDED RELEASE ORAL
Qty: 90 | Refills: 0 | Status: ACTIVE | COMMUNITY
Start: 1900-01-01 | End: 1900-01-01

## 2024-05-29 ENCOUNTER — TRANSCRIPTION ENCOUNTER (OUTPATIENT)
Age: 64
End: 2024-05-29

## 2024-06-23 ENCOUNTER — NON-APPOINTMENT (OUTPATIENT)
Age: 64
End: 2024-06-23

## 2024-07-16 ENCOUNTER — RX RENEWAL (OUTPATIENT)
Age: 64
End: 2024-07-16

## 2024-08-12 ENCOUNTER — APPOINTMENT (OUTPATIENT)
Dept: ENDOCRINOLOGY | Facility: CLINIC | Age: 64
End: 2024-08-12
Payer: COMMERCIAL

## 2024-08-12 VITALS
HEART RATE: 65 BPM | BODY MASS INDEX: 26.92 KG/M2 | DIASTOLIC BLOOD PRESSURE: 68 MMHG | SYSTOLIC BLOOD PRESSURE: 118 MMHG | OXYGEN SATURATION: 97 % | WEIGHT: 193 LBS

## 2024-08-12 DIAGNOSIS — E55.9 VITAMIN D DEFICIENCY, UNSPECIFIED: ICD-10-CM

## 2024-08-12 DIAGNOSIS — E03.9 HYPOTHYROIDISM, UNSPECIFIED: ICD-10-CM

## 2024-08-12 DIAGNOSIS — E66.3 OVERWEIGHT: ICD-10-CM

## 2024-08-12 PROCEDURE — G2211 COMPLEX E/M VISIT ADD ON: CPT

## 2024-08-12 PROCEDURE — 99214 OFFICE O/P EST MOD 30 MIN: CPT

## 2024-08-12 NOTE — HISTORY OF PRESENT ILLNESS
[FreeTextEntry1] : cc: hypothyrroidism and obesity  63 year old woman with hypothyroidism, on levothyroxine 50 micrograms daily.  No cold intolerance, depression, has had some constipation since starting Wegovy  Vtamin D def'y - taking supplemental vitamin D  She also has a thyroid nodule, has not noted any change in her neck, no dyspnea or dysphagia,  Obesity, taking Wegovy, 1.7 mg weekly,  Has continued to lose weight.  Feels well.

## 2024-08-12 NOTE — ASSESSMENT
[FreeTextEntry1] : 63 year old woman with subclinical hypothyroidism, on levothyroxine  - clinically and biochemically euthyroid, continue levothyroxine  Vitamin D def'y - -continue vitamin D  Thyroid nodules: Nondiagnostic FNA in the past x 2 (2010 and 2012).  Last sonogram 5/2022. Repeat ultrasound to monitor for change  Obesity - continue Wegovy, 1.7 mg weekly  f/u   6 months Stable

## 2024-09-03 ENCOUNTER — APPOINTMENT (OUTPATIENT)
Dept: GYNECOLOGIC ONCOLOGY | Facility: CLINIC | Age: 64
End: 2024-09-03
Payer: COMMERCIAL

## 2024-09-03 VITALS
BODY MASS INDEX: 26.6 KG/M2 | WEIGHT: 190 LBS | HEIGHT: 71 IN | TEMPERATURE: 97.3 F | HEART RATE: 66 BPM | DIASTOLIC BLOOD PRESSURE: 76 MMHG | SYSTOLIC BLOOD PRESSURE: 112 MMHG | OXYGEN SATURATION: 98 %

## 2024-09-03 DIAGNOSIS — C54.1 MALIGNANT NEOPLASM OF ENDOMETRIUM: ICD-10-CM

## 2024-09-03 PROCEDURE — 99459 PELVIC EXAMINATION: CPT

## 2024-09-03 PROCEDURE — 99213 OFFICE O/P EST LOW 20 MIN: CPT

## 2024-09-03 PROCEDURE — G2211 COMPLEX E/M VISIT ADD ON: CPT

## 2024-09-03 NOTE — HISTORY OF PRESENT ILLNESS
[FreeTextEntry1] : Stage IAG1 pMMR endometrial cancer R-A TLH, BSO, SLND, 2/16/23 GYN/Ref: Eleni Gordon MD   Ms. Strauss presents for surveillance. She feels well. Denies VB, discharge, bowel or urinary issues.  She has lost 40 lbs on Wegovy.   Vaginal apex biopsy on 6/28/23 - benign  HCM: Mammo - 11/2023 - BIRADS 1 BD - 9/2021 - osteopenia Colonoscopy - 3/2021

## 2024-09-03 NOTE — PHYSICAL EXAM
[Chaperone Present] : A chaperone was present in the examining room during all aspects of the physical examination [[id number]] : A chaperone was present during the pelvic exam. [FreeTextEntry2] : Rhianna [Absent] : Adnexa(ae): Absent [Normal] : Recto-Vaginal Exam: Normal [de-identified] : Robotic incisions [Fully active, able to carry on all pre-disease performance without restriction] : Status 0 - Fully active, able to carry on all pre-disease performance without restriction

## 2025-02-04 ENCOUNTER — NON-APPOINTMENT (OUTPATIENT)
Age: 65
End: 2025-02-04

## 2025-02-07 ENCOUNTER — RX RENEWAL (OUTPATIENT)
Age: 65
End: 2025-02-07

## 2025-02-10 ENCOUNTER — APPOINTMENT (OUTPATIENT)
Facility: CLINIC | Age: 65
End: 2025-02-10

## 2025-02-11 ENCOUNTER — APPOINTMENT (OUTPATIENT)
Dept: MAMMOGRAPHY | Facility: CLINIC | Age: 65
End: 2025-02-11
Payer: COMMERCIAL

## 2025-02-11 ENCOUNTER — RESULT REVIEW (OUTPATIENT)
Age: 65
End: 2025-02-11

## 2025-02-11 PROCEDURE — 77063 BREAST TOMOSYNTHESIS BI: CPT

## 2025-02-11 PROCEDURE — 77067 SCR MAMMO BI INCL CAD: CPT

## 2025-02-19 ENCOUNTER — NON-APPOINTMENT (OUTPATIENT)
Age: 65
End: 2025-02-19

## 2025-02-19 ENCOUNTER — APPOINTMENT (OUTPATIENT)
Dept: ENDOCRINOLOGY | Facility: CLINIC | Age: 65
End: 2025-02-19

## 2025-02-19 ENCOUNTER — OUTPATIENT (OUTPATIENT)
Dept: OUTPATIENT SERVICES | Facility: HOSPITAL | Age: 65
LOS: 1 days | End: 2025-02-19

## 2025-02-19 DIAGNOSIS — Z98.890 OTHER SPECIFIED POSTPROCEDURAL STATES: Chronic | ICD-10-CM

## 2025-02-20 RX ORDER — SEMAGLUTIDE 2.4 MG/.75ML
2.4 INJECTION, SOLUTION SUBCUTANEOUS
Qty: 1 | Refills: 5 | Status: ACTIVE | COMMUNITY
Start: 2025-02-19

## 2025-03-04 ENCOUNTER — APPOINTMENT (OUTPATIENT)
Dept: GYNECOLOGIC ONCOLOGY | Facility: CLINIC | Age: 65
End: 2025-03-04
Payer: COMMERCIAL

## 2025-03-04 ENCOUNTER — NON-APPOINTMENT (OUTPATIENT)
Age: 65
End: 2025-03-04

## 2025-03-04 VITALS
OXYGEN SATURATION: 99 % | BODY MASS INDEX: 26.04 KG/M2 | TEMPERATURE: 98.2 F | DIASTOLIC BLOOD PRESSURE: 79 MMHG | HEIGHT: 71 IN | WEIGHT: 186 LBS | SYSTOLIC BLOOD PRESSURE: 126 MMHG | HEART RATE: 72 BPM

## 2025-03-04 DIAGNOSIS — C54.1 MALIGNANT NEOPLASM OF ENDOMETRIUM: ICD-10-CM

## 2025-03-04 PROCEDURE — 99213 OFFICE O/P EST LOW 20 MIN: CPT

## 2025-03-04 PROCEDURE — G2211 COMPLEX E/M VISIT ADD ON: CPT

## 2025-03-04 PROCEDURE — 99459 PELVIC EXAMINATION: CPT

## 2025-04-02 ENCOUNTER — APPOINTMENT (OUTPATIENT)
Dept: DERMATOLOGY | Facility: CLINIC | Age: 65
End: 2025-04-02

## 2025-04-02 DIAGNOSIS — D22.9 MELANOCYTIC NEVI, UNSPECIFIED: ICD-10-CM

## 2025-04-02 DIAGNOSIS — L21.9 SEBORRHEIC DERMATITIS, UNSPECIFIED: ICD-10-CM

## 2025-04-02 DIAGNOSIS — L82.0 INFLAMED SEBORRHEIC KERATOSIS: ICD-10-CM

## 2025-04-02 DIAGNOSIS — Z12.83 ENCOUNTER FOR SCREENING FOR MALIGNANT NEOPLASM OF SKIN: ICD-10-CM

## 2025-04-02 DIAGNOSIS — D18.01 HEMANGIOMA OF SKIN AND SUBCUTANEOUS TISSUE: ICD-10-CM

## 2025-04-02 PROCEDURE — 99204 OFFICE O/P NEW MOD 45 MIN: CPT | Mod: 25

## 2025-04-02 PROCEDURE — 17110 DESTRUCTION B9 LES UP TO 14: CPT

## 2025-04-02 RX ORDER — FLUOCINOLONE ACETONIDE 0.11 MG/ML
0.01 OIL AURICULAR (OTIC)
Qty: 1 | Refills: 2 | Status: ACTIVE | COMMUNITY
Start: 2025-04-02 | End: 1900-01-01

## 2025-04-22 ENCOUNTER — RX RENEWAL (OUTPATIENT)
Age: 65
End: 2025-04-22

## 2025-05-12 ENCOUNTER — APPOINTMENT (OUTPATIENT)
Facility: CLINIC | Age: 65
End: 2025-05-12

## 2025-05-17 ENCOUNTER — LABORATORY RESULT (OUTPATIENT)
Age: 65
End: 2025-05-17

## 2025-05-17 ENCOUNTER — NON-APPOINTMENT (OUTPATIENT)
Age: 65
End: 2025-05-17

## 2025-05-17 ENCOUNTER — APPOINTMENT (OUTPATIENT)
Dept: INTERNAL MEDICINE | Facility: CLINIC | Age: 65
End: 2025-05-17
Payer: COMMERCIAL

## 2025-05-17 VITALS
RESPIRATION RATE: 14 BRPM | OXYGEN SATURATION: 98 % | HEART RATE: 68 BPM | DIASTOLIC BLOOD PRESSURE: 72 MMHG | SYSTOLIC BLOOD PRESSURE: 120 MMHG

## 2025-05-17 VITALS — BODY MASS INDEX: 25.48 KG/M2 | WEIGHT: 182 LBS | HEIGHT: 71 IN

## 2025-05-17 DIAGNOSIS — Z13.820 ENCOUNTER FOR SCREENING FOR OSTEOPOROSIS: ICD-10-CM

## 2025-05-17 DIAGNOSIS — E03.9 HYPOTHYROIDISM, UNSPECIFIED: ICD-10-CM

## 2025-05-17 DIAGNOSIS — F41.9 ANXIETY DISORDER, UNSPECIFIED: ICD-10-CM

## 2025-05-17 DIAGNOSIS — Z00.00 ENCOUNTER FOR GENERAL ADULT MEDICAL EXAMINATION W/OUT ABNORMAL FINDINGS: ICD-10-CM

## 2025-05-17 PROCEDURE — 99397 PER PM REEVAL EST PAT 65+ YR: CPT

## 2025-05-17 PROCEDURE — 93000 ELECTROCARDIOGRAM COMPLETE: CPT

## 2025-05-17 PROCEDURE — 36415 COLL VENOUS BLD VENIPUNCTURE: CPT

## 2025-05-17 RX ORDER — ALPRAZOLAM 0.5 MG/1
0.5 TABLET ORAL
Qty: 30 | Refills: 0 | Status: ACTIVE | COMMUNITY
Start: 2025-05-17 | End: 1900-01-01

## 2025-05-18 LAB
ALBUMIN SERPL ELPH-MCNC: 4.4 G/DL
ALP BLD-CCNC: 81 U/L
ALT SERPL-CCNC: 14 U/L
ANION GAP SERPL CALC-SCNC: 15 MMOL/L
APPEARANCE: CLEAR
AST SERPL-CCNC: 22 U/L
BASOPHILS # BLD AUTO: 0.1 K/UL
BASOPHILS NFR BLD AUTO: 1.9 %
BILIRUB SERPL-MCNC: 0.5 MG/DL
BILIRUBIN URINE: NEGATIVE
BLOOD URINE: NEGATIVE
BUN SERPL-MCNC: 13 MG/DL
CALCIUM SERPL-MCNC: 9.3 MG/DL
CHLORIDE SERPL-SCNC: 104 MMOL/L
CHOLEST SERPL-MCNC: 181 MG/DL
CO2 SERPL-SCNC: 21 MMOL/L
COLOR: NORMAL
CREAT SERPL-MCNC: 0.82 MG/DL
EGFRCR SERPLBLD CKD-EPI 2021: 79 ML/MIN/1.73M2
EOSINOPHIL # BLD AUTO: 0.19 K/UL
EOSINOPHIL NFR BLD AUTO: 3.7 %
ESTIMATED AVERAGE GLUCOSE: 100 MG/DL
GLUCOSE QUALITATIVE U: NEGATIVE MG/DL
GLUCOSE SERPL-MCNC: 77 MG/DL
HBA1C MFR BLD HPLC: 5.1 %
HCT VFR BLD CALC: 41.9 %
HDLC SERPL-MCNC: 61 MG/DL
HGB BLD-MCNC: 13.2 G/DL
IMM GRANULOCYTES NFR BLD AUTO: 0.2 %
KETONES URINE: NEGATIVE MG/DL
LDLC SERPL-MCNC: 103 MG/DL
LEUKOCYTE ESTERASE URINE: ABNORMAL
LYMPHOCYTES # BLD AUTO: 1.83 K/UL
LYMPHOCYTES NFR BLD AUTO: 35.4 %
MAN DIFF?: NORMAL
MCHC RBC-ENTMCNC: 28.5 PG
MCHC RBC-ENTMCNC: 31.5 G/DL
MCV RBC AUTO: 90.5 FL
MONOCYTES # BLD AUTO: 0.5 K/UL
MONOCYTES NFR BLD AUTO: 9.7 %
NEUTROPHILS # BLD AUTO: 2.54 K/UL
NEUTROPHILS NFR BLD AUTO: 49.1 %
NITRITE URINE: NEGATIVE
NONHDLC SERPL-MCNC: 119 MG/DL
PH URINE: 6
PLATELET # BLD AUTO: 296 K/UL
POTASSIUM SERPL-SCNC: 5 MMOL/L
PROT SERPL-MCNC: 7.2 G/DL
PROTEIN URINE: NEGATIVE MG/DL
RBC # BLD: 4.63 M/UL
RBC # FLD: 13.9 %
SODIUM SERPL-SCNC: 140 MMOL/L
SPECIFIC GRAVITY URINE: 1.02
T3FREE SERPL-MCNC: 2.9 PG/ML
T4 FREE SERPL-MCNC: 1.1 NG/DL
TRIGL SERPL-MCNC: 92 MG/DL
TSH SERPL-ACNC: 3.52 UIU/ML
UROBILINOGEN URINE: 0.2 MG/DL
WBC # FLD AUTO: 5.17 K/UL

## 2025-05-21 ENCOUNTER — NON-APPOINTMENT (OUTPATIENT)
Age: 65
End: 2025-05-21

## 2025-06-02 ENCOUNTER — NON-APPOINTMENT (OUTPATIENT)
Age: 65
End: 2025-06-02

## 2025-07-21 ENCOUNTER — APPOINTMENT (OUTPATIENT)
Facility: CLINIC | Age: 65
End: 2025-07-21
Payer: COMMERCIAL

## 2025-07-21 VITALS
DIASTOLIC BLOOD PRESSURE: 68 MMHG | HEIGHT: 71 IN | SYSTOLIC BLOOD PRESSURE: 115 MMHG | WEIGHT: 189 LBS | BODY MASS INDEX: 26.46 KG/M2 | HEART RATE: 69 BPM

## 2025-07-21 DIAGNOSIS — E55.9 VITAMIN D DEFICIENCY, UNSPECIFIED: ICD-10-CM

## 2025-07-21 DIAGNOSIS — E03.9 HYPOTHYROIDISM, UNSPECIFIED: ICD-10-CM

## 2025-07-21 DIAGNOSIS — E66.3 OVERWEIGHT: ICD-10-CM

## 2025-07-21 DIAGNOSIS — E04.2 NONTOXIC MULTINODULAR GOITER: ICD-10-CM

## 2025-07-21 PROCEDURE — 99214 OFFICE O/P EST MOD 30 MIN: CPT

## 2025-07-21 PROCEDURE — G2211 COMPLEX E/M VISIT ADD ON: CPT

## 2025-08-12 ENCOUNTER — NON-APPOINTMENT (OUTPATIENT)
Age: 65
End: 2025-08-12

## 2025-09-09 ENCOUNTER — APPOINTMENT (OUTPATIENT)
Dept: GYNECOLOGIC ONCOLOGY | Facility: CLINIC | Age: 65
End: 2025-09-09
Payer: COMMERCIAL

## 2025-09-09 VITALS
WEIGHT: 191 LBS | BODY MASS INDEX: 26.74 KG/M2 | HEART RATE: 62 BPM | DIASTOLIC BLOOD PRESSURE: 74 MMHG | SYSTOLIC BLOOD PRESSURE: 133 MMHG | OXYGEN SATURATION: 99 % | HEIGHT: 71 IN

## 2025-09-09 DIAGNOSIS — C54.1 MALIGNANT NEOPLASM OF ENDOMETRIUM: ICD-10-CM

## 2025-09-09 PROCEDURE — 99459 PELVIC EXAMINATION: CPT

## 2025-09-09 PROCEDURE — 99213 OFFICE O/P EST LOW 20 MIN: CPT

## 2025-09-09 PROCEDURE — G2211 COMPLEX E/M VISIT ADD ON: CPT

## (undated) DEVICE — GOWN TRIMAX LG

## (undated) DEVICE — DRAPE 3/4 SHEET 52X76"

## (undated) DEVICE — PRESSURE INFUSOR BAG 1000ML

## (undated) DEVICE — XI SEAL UNIV 5- 8 MM

## (undated) DEVICE — STAPLER SKIN VISI-STAT 35 WIDE

## (undated) DEVICE — PACK D&C

## (undated) DEVICE — DRAPE INSTRUMENT POUCH 6.75" X 11"

## (undated) DEVICE — UTERINE MANIPULATOR CONMED VCARE LG 37MM

## (undated) DEVICE — XI DRAPE ARM

## (undated) DEVICE — SPECIMEN CONTAINER 100ML

## (undated) DEVICE — PREP CHLOROHEXIDINE 4% 118CC KIT

## (undated) DEVICE — LUBRICATING JELLY ONESHOT 1.25OZ

## (undated) DEVICE — MYOSURE SCOPE SEAL

## (undated) DEVICE — SOL IRR BAG NS 0.9% 1000ML

## (undated) DEVICE — ENDOCATCH 10MM SPECIMEN POUCH

## (undated) DEVICE — BASIN SET DOUBLE

## (undated) DEVICE — GLV 6.5 PROTEXIS W HYDROGEL

## (undated) DEVICE — DRAPE LIGHT HANDLE COVER (GREEN)

## (undated) DEVICE — FOLEY TRAY 16FR 5CC LF UMETER CLOSED

## (undated) DEVICE — SUT POLYSORB 0 30" GS-23

## (undated) DEVICE — DRAPE LARGE SHEET 72X85"

## (undated) DEVICE — TUBING FLUID ADMINISTRATION SET PRIM 70"

## (undated) DEVICE — PACK PERI GYN

## (undated) DEVICE — XI ARM SCISSOR MONO CURVED

## (undated) DEVICE — VENODYNE/SCD SLEEVE CALF MEDIUM

## (undated) DEVICE — TUBING STRYKEFLOW II SUCTION / IRRIGATOR

## (undated) DEVICE — POSITIONER FOAM HEAD CRADLE (PINK)

## (undated) DEVICE — WARMING BLANKET UPPER ADULT

## (undated) DEVICE — D HELP - CLEARVIEW CLEARIFY SYSTEM

## (undated) DEVICE — POSITIONER PURPLE ARM ONE STEP (LARGE)

## (undated) DEVICE — FLUENT FMS PROCEDURE KIT

## (undated) DEVICE — DRSG OPSITE 13.75 X 4"

## (undated) DEVICE — XI ARM DISSECTOR CURVED BIPOLAR 8MM

## (undated) DEVICE — XI ARM FORCEP PROGRASP 8MM

## (undated) DEVICE — UTERINE MANIPULATOR CONMED VCARE SM 32MM

## (undated) DEVICE — PACK ROBOTIC LIJ

## (undated) DEVICE — XI ARM NEEDLE DRIVER SUTURECUT MEGA 8MM

## (undated) DEVICE — POSITIONER PINK PAD PIGAZZI SYSTEM

## (undated) DEVICE — GOWN XXXL

## (undated) DEVICE — PACK LITHOTOMY

## (undated) DEVICE — UTERINE MANIPULATOR CONMED VCARE MED 34MM

## (undated) DEVICE — SOL IRR POUR NS 0.9% 500ML

## (undated) DEVICE — XI ARM FORCEP MARYLAND BIPOLAR

## (undated) DEVICE — DRSG STERISTRIPS 0.5 X 4"

## (undated) DEVICE — XI OBTURATOR OPTICAL BLADELESS 8MM

## (undated) DEVICE — XI DRAPE COLUMN

## (undated) DEVICE — SOL IRR POUR H2O 250ML

## (undated) DEVICE — DRSG MASTISOL

## (undated) DEVICE — SUT POLYSORB 4-0 P-12 UNDYED

## (undated) DEVICE — TUBING AIRSEAL TRI-LUMEN FILTERED

## (undated) DEVICE — XI TIP COVER

## (undated) DEVICE — TROCAR SURGIQUEST AIRSEAL 8MMX100MM

## (undated) DEVICE — GLV 6.5 PROTEXIS (WHITE)